# Patient Record
Sex: FEMALE | Race: WHITE | Employment: FULL TIME | URBAN - METROPOLITAN AREA
[De-identification: names, ages, dates, MRNs, and addresses within clinical notes are randomized per-mention and may not be internally consistent; named-entity substitution may affect disease eponyms.]

---

## 2017-01-05 ENCOUNTER — OFFICE VISIT (OUTPATIENT)
Dept: FAMILY MEDICINE CLINIC | Facility: CLINIC | Age: 37
End: 2017-01-05

## 2017-01-05 VITALS
BODY MASS INDEX: 33 KG/M2 | RESPIRATION RATE: 16 BRPM | TEMPERATURE: 98 F | SYSTOLIC BLOOD PRESSURE: 120 MMHG | WEIGHT: 200 LBS | HEART RATE: 80 BPM | DIASTOLIC BLOOD PRESSURE: 76 MMHG

## 2017-01-05 DIAGNOSIS — E53.8 VITAMIN B12 DEFICIENCY: ICD-10-CM

## 2017-01-05 DIAGNOSIS — J45.40 MODERATE PERSISTENT ASTHMA WITHOUT COMPLICATION: ICD-10-CM

## 2017-01-05 DIAGNOSIS — F32.A DEPRESSION, UNSPECIFIED DEPRESSION TYPE: Primary | ICD-10-CM

## 2017-01-05 DIAGNOSIS — J01.90 ACUTE SINUSITIS, RECURRENCE NOT SPECIFIED, UNSPECIFIED LOCATION: ICD-10-CM

## 2017-01-05 DIAGNOSIS — K21.9 GASTROESOPHAGEAL REFLUX DISEASE, ESOPHAGITIS PRESENCE NOT SPECIFIED: ICD-10-CM

## 2017-01-05 PROCEDURE — 99214 OFFICE O/P EST MOD 30 MIN: CPT | Performed by: FAMILY MEDICINE

## 2017-01-05 PROCEDURE — 96372 THER/PROPH/DIAG INJ SC/IM: CPT | Performed by: FAMILY MEDICINE

## 2017-01-05 RX ORDER — LEVOFLOXACIN 500 MG/1
500 TABLET, FILM COATED ORAL DAILY
Qty: 10 TABLET | Refills: 0 | Status: SHIPPED | OUTPATIENT
Start: 2017-01-05 | End: 2017-01-15

## 2017-01-05 RX ORDER — CYANOCOBALAMIN 1000 UG/ML
1000 INJECTION INTRAMUSCULAR; SUBCUTANEOUS ONCE
Status: COMPLETED | OUTPATIENT
Start: 2017-01-05 | End: 2017-01-05

## 2017-01-05 RX ORDER — BENZONATATE 100 MG/1
100 CAPSULE ORAL 3 TIMES DAILY PRN
Qty: 30 CAPSULE | Refills: 0 | Status: SHIPPED | OUTPATIENT
Start: 2017-01-05 | End: 2017-02-04

## 2017-01-05 RX ORDER — MONTELUKAST SODIUM 10 MG/1
10 TABLET ORAL DAILY
Qty: 90 TABLET | Refills: 1 | Status: SHIPPED | OUTPATIENT
Start: 2017-01-05 | End: 2017-05-25

## 2017-01-05 RX ORDER — FLUOXETINE HYDROCHLORIDE 20 MG/1
20 CAPSULE ORAL DAILY
Qty: 90 CAPSULE | Refills: 1 | Status: SHIPPED | OUTPATIENT
Start: 2017-01-05 | End: 2017-02-23

## 2017-01-05 RX ORDER — HYDROCORTISONE VALERATE 2 MG/G
OINTMENT TOPICAL
Qty: 15 G | Refills: 1 | Status: SHIPPED | OUTPATIENT
Start: 2017-01-05 | End: 2017-05-25

## 2017-01-05 RX ORDER — OMEPRAZOLE 20 MG/1
20 CAPSULE, DELAYED RELEASE ORAL
COMMUNITY
End: 2019-10-30

## 2017-01-05 RX ADMIN — CYANOCOBALAMIN 1000 MCG: 1000 INJECTION INTRAMUSCULAR; SUBCUTANEOUS at 11:22:00

## 2017-01-05 NOTE — PROGRESS NOTES
CHIEF COMPLAINT:   Follow up on depression. Also still sick  HPI:   Lisa Rick is a 39year old female who presents for upper respiratory symptoms for  2  weeks. Patient reports congestion. Still with a lot of sinus congestion and cough.   Still Rfl: 4   Albuterol Sulfate  (90 BASE) MCG/ACT Inhalation Aero Soln Inhale 2 puffs into the lungs every 4 (four) hours as needed for Wheezing (With spacer).  Disp: 1 Inhaler Rfl: 0   alprazolam 0.25 MG Oral Tab 1 po once daily prn Disp: 20 tablet Rfl: Vitamin b12 deficiency  Acute sinusitis, recurrence not specified, unspecified location  Gastroesophageal reflux disease, esophagitis presence not specified  Moderate persistent asthma without complication  Depression, unspecified depression type  (prima

## 2017-01-07 PROBLEM — F32.A DEPRESSION: Status: ACTIVE | Noted: 2017-01-07

## 2017-02-02 ENCOUNTER — NURSE ONLY (OUTPATIENT)
Dept: FAMILY MEDICINE CLINIC | Facility: CLINIC | Age: 37
End: 2017-02-02

## 2017-02-02 ENCOUNTER — APPOINTMENT (OUTPATIENT)
Dept: LAB | Age: 37
End: 2017-02-02
Attending: FAMILY MEDICINE
Payer: COMMERCIAL

## 2017-02-02 DIAGNOSIS — E53.8 VITAMIN B12 DEFICIENCY: Primary | ICD-10-CM

## 2017-02-02 DIAGNOSIS — E55.9 VITAMIN D DEFICIENCY: ICD-10-CM

## 2017-02-02 PROCEDURE — 96372 THER/PROPH/DIAG INJ SC/IM: CPT | Performed by: FAMILY MEDICINE

## 2017-02-02 RX ORDER — CYANOCOBALAMIN 1000 UG/ML
1000 INJECTION INTRAMUSCULAR; SUBCUTANEOUS ONCE
Status: COMPLETED | OUTPATIENT
Start: 2017-02-02 | End: 2017-02-02

## 2017-02-02 RX ORDER — ERGOCALCIFEROL 1.25 MG/1
50000 CAPSULE ORAL WEEKLY
Qty: 8 CAPSULE | Refills: 0 | Status: SHIPPED | OUTPATIENT
Start: 2017-02-02 | End: 2017-03-25

## 2017-02-02 RX ADMIN — CYANOCOBALAMIN 1000 MCG: 1000 INJECTION INTRAMUSCULAR; SUBCUTANEOUS at 09:07:00

## 2017-02-23 ENCOUNTER — OFFICE VISIT (OUTPATIENT)
Dept: FAMILY MEDICINE CLINIC | Facility: CLINIC | Age: 37
End: 2017-02-23

## 2017-02-23 VITALS
DIASTOLIC BLOOD PRESSURE: 66 MMHG | TEMPERATURE: 99 F | WEIGHT: 204 LBS | HEIGHT: 65.5 IN | HEART RATE: 80 BPM | BODY MASS INDEX: 33.58 KG/M2 | SYSTOLIC BLOOD PRESSURE: 108 MMHG | RESPIRATION RATE: 12 BRPM

## 2017-02-23 DIAGNOSIS — K21.9 GASTROESOPHAGEAL REFLUX DISEASE, ESOPHAGITIS PRESENCE NOT SPECIFIED: ICD-10-CM

## 2017-02-23 DIAGNOSIS — F32.A DEPRESSION, UNSPECIFIED DEPRESSION TYPE: Primary | ICD-10-CM

## 2017-02-23 DIAGNOSIS — E53.8 VITAMIN B12 DEFICIENCY: ICD-10-CM

## 2017-02-23 DIAGNOSIS — R09.82 POSTNASAL DRIP: ICD-10-CM

## 2017-02-23 PROCEDURE — 99214 OFFICE O/P EST MOD 30 MIN: CPT | Performed by: FAMILY MEDICINE

## 2017-02-23 PROCEDURE — 96372 THER/PROPH/DIAG INJ SC/IM: CPT | Performed by: FAMILY MEDICINE

## 2017-02-23 RX ORDER — CYANOCOBALAMIN 1000 UG/ML
1000 INJECTION INTRAMUSCULAR; SUBCUTANEOUS ONCE
Status: COMPLETED | OUTPATIENT
Start: 2017-02-23 | End: 2017-02-23

## 2017-02-23 RX ORDER — FLUOXETINE HYDROCHLORIDE 40 MG/1
40 CAPSULE ORAL DAILY
Qty: 30 CAPSULE | Refills: 3 | Status: SHIPPED | OUTPATIENT
Start: 2017-02-23 | End: 2017-05-25

## 2017-02-23 RX ORDER — FLUTICASONE PROPIONATE 50 MCG
2 SPRAY, SUSPENSION (ML) NASAL DAILY
Qty: 1 BOTTLE | Refills: 11 | Status: SHIPPED | OUTPATIENT
Start: 2017-02-23 | End: 2018-02-16

## 2017-02-23 RX ADMIN — CYANOCOBALAMIN 1000 MCG: 1000 INJECTION INTRAMUSCULAR; SUBCUTANEOUS at 09:33:00

## 2017-02-23 NOTE — PATIENT INSTRUCTIONS
Stop the 50,000 IU once weekly of vitamin D.  Start 5,000 IU daily of vitamin D. Repeat a vitamin D level in 10 weeks.

## 2017-02-23 NOTE — PROGRESS NOTES
Joni Lew is a 39year old female. CHIEF COMPLAINT   Follow up on depression, GERD, nasal congestion  HPI:   Seeing a counselor every two weeks. Fei Pompa LCSW. Prozac 20mg daily. Helps with anxiety but still with depression.  STill wi Status: Never Used                        Alcohol Use: No                 REVIEW OF SYSTEMS:   GENERAL HEALTH: feels well otherwise  SKIN: denies any unusual skin lesions or rashes  RESPIRATORY: denies shortness of breath with exertion  CARDIOVASCULAR: den

## 2017-03-23 ENCOUNTER — NURSE ONLY (OUTPATIENT)
Dept: FAMILY MEDICINE CLINIC | Facility: CLINIC | Age: 37
End: 2017-03-23

## 2017-03-23 DIAGNOSIS — E53.8 VITAMIN B12 DEFICIENCY: Primary | ICD-10-CM

## 2017-03-23 PROCEDURE — 96372 THER/PROPH/DIAG INJ SC/IM: CPT | Performed by: FAMILY MEDICINE

## 2017-03-23 RX ORDER — CYANOCOBALAMIN 1000 UG/ML
1000 INJECTION INTRAMUSCULAR; SUBCUTANEOUS ONCE
Status: COMPLETED | OUTPATIENT
Start: 2017-03-23 | End: 2017-03-23

## 2017-03-23 RX ADMIN — CYANOCOBALAMIN 1000 MCG: 1000 INJECTION INTRAMUSCULAR; SUBCUTANEOUS at 13:36:00

## 2017-03-27 RX ORDER — ERGOCALCIFEROL 1.25 MG/1
CAPSULE ORAL
Qty: 8 CAPSULE | Refills: 0 | Status: SHIPPED | OUTPATIENT
Start: 2017-03-27 | End: 2017-07-03

## 2017-04-07 ENCOUNTER — OFFICE VISIT (OUTPATIENT)
Dept: FAMILY MEDICINE CLINIC | Facility: CLINIC | Age: 37
End: 2017-04-07

## 2017-04-07 ENCOUNTER — TELEPHONE (OUTPATIENT)
Dept: FAMILY MEDICINE CLINIC | Facility: CLINIC | Age: 37
End: 2017-04-07

## 2017-04-07 VITALS
WEIGHT: 203 LBS | HEART RATE: 76 BPM | DIASTOLIC BLOOD PRESSURE: 70 MMHG | RESPIRATION RATE: 12 BRPM | SYSTOLIC BLOOD PRESSURE: 114 MMHG | BODY MASS INDEX: 33 KG/M2

## 2017-04-07 DIAGNOSIS — G43.909 MIGRAINE WITHOUT STATUS MIGRAINOSUS, NOT INTRACTABLE, UNSPECIFIED MIGRAINE TYPE: ICD-10-CM

## 2017-04-07 DIAGNOSIS — H43.392 FLOATER, VITREOUS, LEFT: ICD-10-CM

## 2017-04-07 DIAGNOSIS — H57.02 PUPIL ASYMMETRY: ICD-10-CM

## 2017-04-07 DIAGNOSIS — H11.32 SUBCONJUNCTIVAL HEMORRHAGE, LEFT: Primary | ICD-10-CM

## 2017-04-07 PROCEDURE — 99213 OFFICE O/P EST LOW 20 MIN: CPT | Performed by: FAMILY MEDICINE

## 2017-04-07 RX ORDER — SUMATRIPTAN 100 MG/1
TABLET, FILM COATED ORAL
Qty: 9 TABLET | Refills: 1 | Status: SHIPPED | OUTPATIENT
Start: 2017-04-07 | End: 2019-03-26

## 2017-04-07 NOTE — PROGRESS NOTES
Frantz Delcid is a 39year old female. CHIEF COMPLAINT   Headache and eye red  HPI:   Sneezing a lot yesterday with allergies. Also had a headache yesterday - still with frontal headache today but better today than yesterday.   History of migraines Smoking Status: Never Smoker                      Smokeless Status: Never Used                        Alcohol Use: No                 REVIEW OF SYSTEMS:   GENERAL HEALTH: feels well otherwise  SKIN: denies any unusual skin lesions or rashes  RESPIRATORY: d

## 2017-04-07 NOTE — TELEPHONE ENCOUNTER
Pt transferred to nurse. Reports having terrible h/a yesterday, had to take a few doses of excedrin migraine for it finally to calm down. No n/v. No blurry vision.  This am still has the h/a, not as bad but has broken blood vessels in eye, wants to make claudia

## 2017-04-20 ENCOUNTER — NURSE ONLY (OUTPATIENT)
Dept: FAMILY MEDICINE CLINIC | Facility: CLINIC | Age: 37
End: 2017-04-20

## 2017-04-20 PROCEDURE — 96372 THER/PROPH/DIAG INJ SC/IM: CPT | Performed by: FAMILY MEDICINE

## 2017-04-20 RX ORDER — CYANOCOBALAMIN 1000 UG/ML
1000 INJECTION INTRAMUSCULAR; SUBCUTANEOUS ONCE
Status: COMPLETED | OUTPATIENT
Start: 2017-04-20 | End: 2017-04-20

## 2017-04-20 RX ADMIN — CYANOCOBALAMIN 1000 MCG: 1000 INJECTION INTRAMUSCULAR; SUBCUTANEOUS at 09:22:00

## 2017-05-02 ENCOUNTER — TELEPHONE (OUTPATIENT)
Dept: FAMILY MEDICINE CLINIC | Facility: CLINIC | Age: 37
End: 2017-05-02

## 2017-05-02 NOTE — TELEPHONE ENCOUNTER
A Medical Records Release of Information Request Form was rec'd via fax from [facility] on 4/25/17. Request was for last 5yrs of pt's records, however, pt was a new patient to our office ([facility]) on 11/14/2016.   Pt's signed [employer] Release dtd 4/

## 2017-05-25 ENCOUNTER — OFFICE VISIT (OUTPATIENT)
Dept: FAMILY MEDICINE CLINIC | Facility: CLINIC | Age: 37
End: 2017-05-25

## 2017-05-25 VITALS
TEMPERATURE: 97 F | WEIGHT: 188 LBS | RESPIRATION RATE: 16 BRPM | BODY MASS INDEX: 30.95 KG/M2 | HEIGHT: 65.5 IN | DIASTOLIC BLOOD PRESSURE: 70 MMHG | HEART RATE: 82 BPM | SYSTOLIC BLOOD PRESSURE: 110 MMHG

## 2017-05-25 DIAGNOSIS — E55.9 VITAMIN D DEFICIENCY: ICD-10-CM

## 2017-05-25 DIAGNOSIS — F32.A DEPRESSION, UNSPECIFIED DEPRESSION TYPE: Primary | ICD-10-CM

## 2017-05-25 DIAGNOSIS — E53.8 VITAMIN B12 DEFICIENCY: ICD-10-CM

## 2017-05-25 PROCEDURE — 96372 THER/PROPH/DIAG INJ SC/IM: CPT | Performed by: FAMILY MEDICINE

## 2017-05-25 PROCEDURE — 99213 OFFICE O/P EST LOW 20 MIN: CPT | Performed by: FAMILY MEDICINE

## 2017-05-25 RX ORDER — FLUOXETINE HYDROCHLORIDE 20 MG/1
CAPSULE ORAL
Qty: 90 CAPSULE | Refills: 1 | Status: SHIPPED | OUTPATIENT
Start: 2017-05-25 | End: 2017-07-26

## 2017-05-25 RX ORDER — ERGOCALCIFEROL 1.25 MG/1
CAPSULE ORAL
Qty: 8 CAPSULE | Refills: 0 | OUTPATIENT
Start: 2017-05-25

## 2017-05-25 RX ORDER — ALBUTEROL SULFATE 90 UG/1
AEROSOL, METERED RESPIRATORY (INHALATION)
Qty: 8.5 G | Refills: 2 | Status: SHIPPED | OUTPATIENT
Start: 2017-05-25 | End: 2019-03-26

## 2017-05-25 RX ORDER — MONTELUKAST SODIUM 10 MG/1
10 TABLET ORAL DAILY
Qty: 90 TABLET | Refills: 1 | Status: SHIPPED | OUTPATIENT
Start: 2017-05-25 | End: 2017-10-30

## 2017-05-25 RX ORDER — ALPRAZOLAM 0.25 MG/1
TABLET ORAL
Qty: 20 TABLET | Refills: 0 | OUTPATIENT
Start: 2017-05-25

## 2017-05-25 RX ORDER — FLUOXETINE HYDROCHLORIDE 40 MG/1
40 CAPSULE ORAL DAILY
Qty: 30 CAPSULE | Refills: 3 | Status: CANCELLED | OUTPATIENT
Start: 2017-05-25

## 2017-05-25 RX ORDER — CYANOCOBALAMIN 1000 UG/ML
1000 INJECTION INTRAMUSCULAR; SUBCUTANEOUS ONCE
Status: COMPLETED | OUTPATIENT
Start: 2017-05-25 | End: 2017-05-25

## 2017-05-25 RX ORDER — HYDROCORTISONE VALERATE 2 MG/G
OINTMENT TOPICAL
Qty: 15 G | Refills: 0 | Status: SHIPPED | OUTPATIENT
Start: 2017-05-25 | End: 2017-07-03

## 2017-05-25 RX ADMIN — CYANOCOBALAMIN 1000 MCG: 1000 INJECTION INTRAMUSCULAR; SUBCUTANEOUS at 08:43:00

## 2017-05-25 NOTE — PROGRESS NOTES
Conner Godfrey is a 39year old female. CHIEF COMPLAINT   Follow up on depression/anxiety  HPI:   Seeing a counselor twice per month. Working out 3 days per week. Still with anxiety and stress. Still feels down at time.   Not suicidal. Overall sym Alcohol Use: No                 REVIEW OF SYSTEMS:   GENERAL HEALTH: feels well otherwise  SKIN: denies any unusual skin lesions or rashes  RESPIRATORY: denies shortness of breath with exertion  CARDIOVASCULAR: denies chest pain on exertion  GI

## 2017-05-25 NOTE — PATIENT INSTRUCTIONS
Vitamin B12 shots every other week for 12 weeks then check vitamin B12. Vitamin D - take 1,000 IU daily and repeat vitamin D in 12 weeks with the B12.

## 2017-06-08 ENCOUNTER — NURSE ONLY (OUTPATIENT)
Dept: FAMILY MEDICINE CLINIC | Facility: CLINIC | Age: 37
End: 2017-06-08

## 2017-06-08 PROCEDURE — 96372 THER/PROPH/DIAG INJ SC/IM: CPT | Performed by: FAMILY MEDICINE

## 2017-06-08 RX ORDER — CYANOCOBALAMIN 1000 UG/ML
1000 INJECTION INTRAMUSCULAR; SUBCUTANEOUS ONCE
Status: COMPLETED | OUTPATIENT
Start: 2017-06-08 | End: 2017-06-08

## 2017-06-08 RX ADMIN — CYANOCOBALAMIN 1000 MCG: 1000 INJECTION INTRAMUSCULAR; SUBCUTANEOUS at 09:06:00

## 2017-06-23 ENCOUNTER — NURSE ONLY (OUTPATIENT)
Dept: FAMILY MEDICINE CLINIC | Facility: CLINIC | Age: 37
End: 2017-06-23

## 2017-06-23 DIAGNOSIS — E53.8 VITAMIN B12 DEFICIENCY: Primary | ICD-10-CM

## 2017-06-23 PROCEDURE — 96372 THER/PROPH/DIAG INJ SC/IM: CPT | Performed by: FAMILY MEDICINE

## 2017-06-23 RX ORDER — CYANOCOBALAMIN 1000 UG/ML
1000 INJECTION INTRAMUSCULAR; SUBCUTANEOUS ONCE
Status: COMPLETED | OUTPATIENT
Start: 2017-06-23 | End: 2017-06-23

## 2017-06-23 RX ADMIN — CYANOCOBALAMIN 1000 MCG: 1000 INJECTION INTRAMUSCULAR; SUBCUTANEOUS at 14:06:00

## 2017-07-03 ENCOUNTER — OFFICE VISIT (OUTPATIENT)
Dept: OBGYN CLINIC | Facility: CLINIC | Age: 37
End: 2017-07-03

## 2017-07-03 VITALS
DIASTOLIC BLOOD PRESSURE: 70 MMHG | WEIGHT: 177 LBS | BODY MASS INDEX: 29.85 KG/M2 | HEART RATE: 91 BPM | HEIGHT: 64.7 IN | SYSTOLIC BLOOD PRESSURE: 108 MMHG

## 2017-07-03 DIAGNOSIS — N76.3 CHRONIC VULVITIS: ICD-10-CM

## 2017-07-03 DIAGNOSIS — Z01.419 ENCOUNTER FOR GYNECOLOGICAL EXAMINATION WITHOUT ABNORMAL FINDING: Primary | ICD-10-CM

## 2017-07-03 DIAGNOSIS — Z30.41 ENCOUNTER FOR BIRTH CONTROL PILLS MAINTENANCE: ICD-10-CM

## 2017-07-03 PROCEDURE — 99385 PREV VISIT NEW AGE 18-39: CPT | Performed by: OBSTETRICS & GYNECOLOGY

## 2017-07-03 PROCEDURE — 99202 OFFICE O/P NEW SF 15 MIN: CPT | Performed by: OBSTETRICS & GYNECOLOGY

## 2017-07-03 RX ORDER — LEVONORGESTREL AND ETHINYL ESTRADIOL 0.1-0.02MG
1 KIT ORAL DAILY
Qty: 90 TABLET | Refills: 4 | Status: SHIPPED | OUTPATIENT
Start: 2017-07-03 | End: 2018-07-27

## 2017-07-03 NOTE — PROGRESS NOTES
Well Woman Exam    HPI:  The patient is a 45yo new patient who had been seeing Dr. Merced Walker. She is here for annual exam today. She denies any abnormal bleeding.  She had one abnormal pap smear in 2010 for which she states she had a colpo and repeat pap smea Other Topics Concern    Caffeine Concern Yes    Comment: 3-5 per day     Stress Concern Yes    Special Diet Yes    Comment: gluten free, diary free    Exercise No    Seat Belt Yes     Social History Narrative   None on file       FAMILY HISTORY:  Famil Systems:  Constitutional:  Denies fevers and chills   Cardiovascular:  denies chest pain or palpitations  Respiratory:  denies shortness of breath  Gastrointestinal:  Has constipation   Genitourinary:  denies dysuria, incontinence, abnormal vaginal dischar reviewed. Increased risk for heart attack and stroke especially with tobacco use was also discussed. Potential side effects and non-contraceptive benefits were reviewed.   She was also counseled on the use of condoms to decrease the risk of pregnancy and

## 2017-07-05 ENCOUNTER — TELEPHONE (OUTPATIENT)
Dept: OBGYN CLINIC | Facility: CLINIC | Age: 37
End: 2017-07-05

## 2017-07-05 LAB
GENITAL VAGINOSIS SCREEN: NEGATIVE
TRICHOMONAS SCREEN: NEGATIVE

## 2017-07-05 NOTE — TELEPHONE ENCOUNTER
----- Message from Mabeline Lesch, MD sent at 7/5/2017 12:15 PM CDT -----  Please let the patient know that her vaginal culture is negative.

## 2017-07-07 ENCOUNTER — NURSE ONLY (OUTPATIENT)
Dept: FAMILY MEDICINE CLINIC | Facility: CLINIC | Age: 37
End: 2017-07-07

## 2017-07-07 DIAGNOSIS — E53.8 VITAMIN B12 DEFICIENCY: Primary | ICD-10-CM

## 2017-07-07 PROCEDURE — 96372 THER/PROPH/DIAG INJ SC/IM: CPT | Performed by: FAMILY MEDICINE

## 2017-07-07 RX ORDER — CYANOCOBALAMIN 1000 UG/ML
1000 INJECTION INTRAMUSCULAR; SUBCUTANEOUS ONCE
Status: COMPLETED | OUTPATIENT
Start: 2017-07-07 | End: 2017-07-07

## 2017-07-07 RX ADMIN — CYANOCOBALAMIN 1000 MCG: 1000 INJECTION INTRAMUSCULAR; SUBCUTANEOUS at 14:08:00

## 2017-07-24 ENCOUNTER — NURSE ONLY (OUTPATIENT)
Dept: FAMILY MEDICINE CLINIC | Facility: CLINIC | Age: 37
End: 2017-07-24

## 2017-07-24 DIAGNOSIS — E53.8 VITAMIN B12 DEFICIENCY: Primary | ICD-10-CM

## 2017-07-24 PROCEDURE — 96372 THER/PROPH/DIAG INJ SC/IM: CPT | Performed by: FAMILY MEDICINE

## 2017-07-24 RX ORDER — CYANOCOBALAMIN 1000 UG/ML
1000 INJECTION INTRAMUSCULAR; SUBCUTANEOUS ONCE
Status: COMPLETED | OUTPATIENT
Start: 2017-07-24 | End: 2017-07-24

## 2017-07-24 RX ADMIN — CYANOCOBALAMIN 1000 MCG: 1000 INJECTION INTRAMUSCULAR; SUBCUTANEOUS at 16:05:00

## 2017-07-26 RX ORDER — FLUOXETINE HYDROCHLORIDE 20 MG/1
CAPSULE ORAL
Qty: 30 CAPSULE | Refills: 0 | Status: SHIPPED | OUTPATIENT
Start: 2017-07-26 | End: 2017-08-03

## 2017-07-31 ENCOUNTER — OFFICE VISIT (OUTPATIENT)
Dept: OBGYN CLINIC | Facility: CLINIC | Age: 37
End: 2017-07-31

## 2017-07-31 VITALS
BODY MASS INDEX: 30 KG/M2 | SYSTOLIC BLOOD PRESSURE: 121 MMHG | DIASTOLIC BLOOD PRESSURE: 78 MMHG | WEIGHT: 177.19 LBS | HEART RATE: 79 BPM

## 2017-07-31 DIAGNOSIS — N90.89 VULVAR IRRITATION: Primary | ICD-10-CM

## 2017-07-31 PROCEDURE — 99213 OFFICE O/P EST LOW 20 MIN: CPT | Performed by: OBSTETRICS & GYNECOLOGY

## 2017-07-31 NOTE — PROGRESS NOTES
Vaishali Gamez is a 39year old female [id number] Patient's last menstrual period was 06/14/2017. Patient presents with:   Follow - Up: f/u on vaginal rash  Patient presents today for follow up after being seen on 7/3/17 for annual exam with chronic vulv Outpatient Prescriptions:   •  triamcinolone acetonide 0.1 % External Ointment, Apply 1 Application topically 2 (two) times daily. , Disp: 15 g, Rfl: 6  •  FLUoxetine HCl (PROZAC) 20 MG Oral Cap, 3 po daily, Disp: 30 capsule, Rfl: 0  •  Levonorgestrel-Ethin follow - up. Diagnoses and all orders for this visit:    Vulvar irritation    Other orders  -     triamcinolone acetonide 0.1 % External Ointment; Apply 1 Application topically 2 (two) times daily.           Signed Prescriptions Disp Refills    lynetteo

## 2017-08-03 ENCOUNTER — OFFICE VISIT (OUTPATIENT)
Dept: FAMILY MEDICINE CLINIC | Facility: CLINIC | Age: 37
End: 2017-08-03

## 2017-08-03 VITALS
BODY MASS INDEX: 30.56 KG/M2 | RESPIRATION RATE: 16 BRPM | HEIGHT: 64 IN | TEMPERATURE: 99 F | SYSTOLIC BLOOD PRESSURE: 100 MMHG | WEIGHT: 179 LBS | DIASTOLIC BLOOD PRESSURE: 64 MMHG | HEART RATE: 64 BPM

## 2017-08-03 DIAGNOSIS — F32.A DEPRESSION, UNSPECIFIED DEPRESSION TYPE: Primary | ICD-10-CM

## 2017-08-03 PROCEDURE — 99213 OFFICE O/P EST LOW 20 MIN: CPT | Performed by: FAMILY MEDICINE

## 2017-08-03 RX ORDER — MUPIROCIN CALCIUM 20 MG/G
CREAM TOPICAL
Qty: 15 G | Refills: 1 | Status: SHIPPED | OUTPATIENT
Start: 2017-08-03 | End: 2019-03-26

## 2017-08-03 RX ORDER — ALPRAZOLAM 0.25 MG/1
TABLET ORAL
Qty: 30 TABLET | Refills: 0 | Status: SHIPPED | OUTPATIENT
Start: 2017-08-03 | End: 2017-10-02

## 2017-08-03 RX ORDER — BUPROPION HYDROCHLORIDE 150 MG/1
150 TABLET ORAL DAILY
Qty: 30 TABLET | Refills: 1 | Status: SHIPPED | OUTPATIENT
Start: 2017-08-03 | End: 2017-10-30 | Stop reason: ALTCHOICE

## 2017-08-03 NOTE — PROGRESS NOTES
Lisa Rick is a 39year old female. CHIEF COMPLAINT   Follow up on depression  HPI:   Not doing well before and during menses. Fatigue during those weeks, depression worse during those weeks. Seeing a counselor once to twice per month.   On carolyn HEALTH: feels well otherwise  SKIN: as above  RESPIRATORY: denies shortness of breath with exertion  CARDIOVASCULAR: denies chest pain on exertion  GI: denies abdominal pain and denies heartburn  NEURO: denies headaches    EXAM:   /64   Pulse 64   Te

## 2017-08-03 NOTE — PATIENT INSTRUCTIONS
Decrease prozac to 40mg daily x 1 week then decrease to 20mg x 1 week then stop. Once you get to 20mg, start the wellbutrin.

## 2017-10-02 ENCOUNTER — PATIENT MESSAGE (OUTPATIENT)
Dept: FAMILY MEDICINE CLINIC | Facility: CLINIC | Age: 37
End: 2017-10-02

## 2017-10-02 NOTE — TELEPHONE ENCOUNTER
From: Kamryn Smith  To: Irma Herring  Sent: 10/2/2017 2:56 PM CDT  Subject: Prescription Question    Alfonzo Parker Postal-    I hope you had a good weekend.  I've been in touch trying to find a psychiatrist that is in network with my health ins

## 2017-10-03 RX ORDER — ALPRAZOLAM 0.25 MG/1
TABLET ORAL
Qty: 30 TABLET | Refills: 0
Start: 2017-10-03 | End: 2017-11-27

## 2017-10-03 RX ORDER — FLUOXETINE HYDROCHLORIDE 20 MG/1
20 CAPSULE ORAL DAILY
Qty: 30 CAPSULE | Refills: 1 | Status: SHIPPED | OUTPATIENT
Start: 2017-10-03 | End: 2018-11-14

## 2017-10-03 NOTE — TELEPHONE ENCOUNTER
From: Ashlee Pride  Sent: 10/2/2017 2:51 PM CDT  Subject: Medication Renewal Request    Ashlee Pride would like a refill of the following medications:     ALPRAZolam 0.25 MG Oral Tab Caio Martinez PA-C]    Preferred pharmacy: [facility]

## 2017-10-30 ENCOUNTER — OFFICE VISIT (OUTPATIENT)
Dept: FAMILY MEDICINE CLINIC | Facility: CLINIC | Age: 37
End: 2017-10-30

## 2017-10-30 VITALS
HEART RATE: 68 BPM | HEIGHT: 64 IN | TEMPERATURE: 98 F | RESPIRATION RATE: 12 BRPM | DIASTOLIC BLOOD PRESSURE: 80 MMHG | SYSTOLIC BLOOD PRESSURE: 120 MMHG | BODY MASS INDEX: 31.07 KG/M2 | WEIGHT: 182 LBS

## 2017-10-30 DIAGNOSIS — F32.A DEPRESSION, UNSPECIFIED DEPRESSION TYPE: ICD-10-CM

## 2017-10-30 DIAGNOSIS — E55.9 VITAMIN D DEFICIENCY: Primary | ICD-10-CM

## 2017-10-30 DIAGNOSIS — E53.8 VITAMIN B12 DEFICIENCY: ICD-10-CM

## 2017-10-30 DIAGNOSIS — J45.40 MODERATE PERSISTENT ASTHMA WITHOUT COMPLICATION: ICD-10-CM

## 2017-10-30 PROCEDURE — 99214 OFFICE O/P EST MOD 30 MIN: CPT | Performed by: FAMILY MEDICINE

## 2017-10-30 RX ORDER — MONTELUKAST SODIUM 10 MG/1
10 TABLET ORAL DAILY
Qty: 90 TABLET | Refills: 1 | Status: SHIPPED | OUTPATIENT
Start: 2017-10-30 | End: 2018-05-06

## 2017-10-30 RX ORDER — MULTIVIT-MIN/IRON/FOLIC ACID/K 18-600-40
CAPSULE ORAL
COMMUNITY
End: 2019-10-30

## 2017-10-30 NOTE — PROGRESS NOTES
Juan Alberto English is a 44year old female. CHIEF COMPLAINT   Follow up on depression, vitamin B12/vitamin d deficiency, asthma  HPI:   Saw psychiatry 2 weeks ago. Dr. Lisa Aguilar - psychiatry. Psychiatry believes she may has bipolar tendencies.    Started o • Extrinsic asthma, unspecified    • GI DISORDER     reflux   • Herpes simplex       Social History:  Smoking status: Never Smoker                                                              Smokeless tobacco: Never Used                      Alcohol use

## 2017-11-28 RX ORDER — ALPRAZOLAM 0.25 MG/1
TABLET ORAL
Qty: 30 TABLET | Refills: 0 | Status: SHIPPED | OUTPATIENT
Start: 2017-11-28

## 2017-11-28 NOTE — TELEPHONE ENCOUNTER
Not protocol medication. LOV :10/30/17 med check   Last labs done :11/14/16  Next appointment :not yet made. Please see pending medication. Refill if appropriate.    Last refill:    Date:10/03/17  Amount :30 tablets no refill   Medication: alprazolam

## 2017-12-15 ENCOUNTER — HOSPITAL ENCOUNTER (OUTPATIENT)
Age: 37
Discharge: HOME | End: 2017-12-15
Attending: EMERGENCY MEDICINE
Payer: COMMERCIAL

## 2017-12-15 VITALS
SYSTOLIC BLOOD PRESSURE: 125 MMHG | HEIGHT: 64 IN | OXYGEN SATURATION: 99 % | BODY MASS INDEX: 30.73 KG/M2 | WEIGHT: 180 LBS | DIASTOLIC BLOOD PRESSURE: 80 MMHG | HEART RATE: 86 BPM | RESPIRATION RATE: 19 BRPM | TEMPERATURE: 98 F

## 2017-12-15 DIAGNOSIS — B02.9 HERPES ZOSTER WITHOUT COMPLICATION: Primary | ICD-10-CM

## 2017-12-15 PROCEDURE — 99203 OFFICE O/P NEW LOW 30 MIN: CPT

## 2017-12-15 PROCEDURE — 99213 OFFICE O/P EST LOW 20 MIN: CPT

## 2017-12-15 RX ORDER — FAMCICLOVIR 500 MG/1
500 TABLET, FILM COATED ORAL 3 TIMES DAILY
Qty: 21 TABLET | Refills: 0 | Status: SHIPPED | OUTPATIENT
Start: 2017-12-15 | End: 2017-12-21

## 2017-12-15 RX ORDER — LAMOTRIGINE 100 MG/1
TABLET ORAL
Refills: 1 | COMMUNITY
Start: 2017-11-08 | End: 2018-11-14

## 2017-12-15 RX ORDER — IBUPROFEN 600 MG/1
600 TABLET ORAL ONCE
Status: COMPLETED | OUTPATIENT
Start: 2017-12-15 | End: 2017-12-15

## 2017-12-15 RX ORDER — HYDROCODONE BITARTRATE AND ACETAMINOPHEN 5; 325 MG/1; MG/1
1-2 TABLET ORAL EVERY 4 HOURS PRN
Qty: 20 TABLET | Refills: 0 | Status: SHIPPED | OUTPATIENT
Start: 2017-12-15 | End: 2017-12-22

## 2017-12-15 NOTE — ED PROVIDER NOTES
Patient Seen in: [address]    History   Patient presents with:  Rash    Stated Complaint: Back pain and rash     HPI    The patient is a 42-year-old female who presents with 2 days of severe left upper back pain just un Neurological: She has normal strength. No sensory deficit. Skin: Rash noted. Rash is vesicular. Nursing note and vitals reviewed.             ED Course   Labs Reviewed - No data to display    ED Course as of Dec 15 1645  --------------------------------

## 2017-12-15 NOTE — ED INITIAL ASSESSMENT (HPI)
Back pain for several days  Pain is unable to be managed at home with OTC  +itching   Pt.  Noticed a rash today  +vesicular rash noted to left thoracic area that radiates to lateral chest  -drainage

## 2017-12-20 ENCOUNTER — TELEPHONE (OUTPATIENT)
Dept: FAMILY MEDICINE CLINIC | Facility: CLINIC | Age: 37
End: 2017-12-20

## 2017-12-20 NOTE — TELEPHONE ENCOUNTER
See Urgent care note. Patient e-mail was accurate. She was told to call triage. She has shingles exactly like the progress note says. On Famvir 500 mg TID and Norco 5/325 PRN.  Has 5 left of Famvir and 4 Norco left and with long weekend coming she's nervous

## 2017-12-21 ENCOUNTER — OFFICE VISIT (OUTPATIENT)
Dept: FAMILY MEDICINE CLINIC | Facility: CLINIC | Age: 37
End: 2017-12-21

## 2017-12-21 VITALS
RESPIRATION RATE: 16 BRPM | SYSTOLIC BLOOD PRESSURE: 112 MMHG | WEIGHT: 182 LBS | HEIGHT: 64 IN | TEMPERATURE: 99 F | OXYGEN SATURATION: 98 % | HEART RATE: 81 BPM | BODY MASS INDEX: 31.07 KG/M2 | DIASTOLIC BLOOD PRESSURE: 76 MMHG

## 2017-12-21 DIAGNOSIS — B02.9 HERPES ZOSTER WITHOUT COMPLICATION: Primary | ICD-10-CM

## 2017-12-21 DIAGNOSIS — R07.89 CHEST DISCOMFORT: ICD-10-CM

## 2017-12-21 PROCEDURE — 93000 ELECTROCARDIOGRAM COMPLETE: CPT | Performed by: NURSE PRACTITIONER

## 2017-12-21 PROCEDURE — 99214 OFFICE O/P EST MOD 30 MIN: CPT | Performed by: NURSE PRACTITIONER

## 2017-12-21 RX ORDER — FAMCICLOVIR 500 MG/1
500 TABLET, FILM COATED ORAL 3 TIMES DAILY
Qty: 9 TABLET | Refills: 0 | Status: SHIPPED | OUTPATIENT
Start: 2017-12-21 | End: 2017-12-24

## 2017-12-21 RX ORDER — HYDROCODONE BITARTRATE AND ACETAMINOPHEN 5; 325 MG/1; MG/1
1-2 TABLET ORAL EVERY 4 HOURS PRN
Qty: 20 TABLET | Refills: 0 | Status: SHIPPED | OUTPATIENT
Start: 2017-12-21 | End: 2018-01-12 | Stop reason: ALTCHOICE

## 2017-12-21 RX ORDER — PREGABALIN 50 MG/1
50 CAPSULE ORAL 2 TIMES DAILY
Qty: 60 CAPSULE | Refills: 1 | Status: SHIPPED | OUTPATIENT
Start: 2017-12-21 | End: 2018-01-12 | Stop reason: DRUGHIGH

## 2017-12-21 NOTE — PROGRESS NOTES
Vaishali Gamez is a 44year old female. HPI:   Patient presents today for a follow up from immediate care. She was seen 12/15/17 at immediate care and was diagnosed with shingles.  She was started on Famciclovir 500 mg TID and given an rx for Norco. Cap Take 1 capsule (20 mg total) by mouth daily. Disp: 30 capsule Rfl: 1   Mupirocin Calcium 2 % External Cream Apply TID x 7 days. Disp: 15 g Rfl: 1   triamcinolone acetonide 0.1 % External Ointment Apply 1 Application topically 2 (two) times daily.  Disp: of erythematous vesicles extending across pt's anterior left rib cage and a few similar lesions near the left breast. There is some crusting noted to the cluster of vesicles to the left mid back. No drainage noted.   NECK: supple,no adenopathy  LUNGS: clear 1  - Famciclovir 500 MG Oral Tab; Take 1 tablet (500 mg total) by mouth 3 (three) times daily. Dispense: 9 tablet; Refill: 0  - COMP METABOLIC PANEL (14)    2.  Chest discomfort  EKG completed- NSR, no acute changes.   - ELECTROCARDIOGRAM, COMPLETE  - COMP

## 2018-01-03 ENCOUNTER — OFFICE VISIT (OUTPATIENT)
Dept: FAMILY MEDICINE CLINIC | Facility: CLINIC | Age: 38
End: 2018-01-03

## 2018-01-03 VITALS
SYSTOLIC BLOOD PRESSURE: 118 MMHG | RESPIRATION RATE: 16 BRPM | WEIGHT: 188 LBS | HEIGHT: 64 IN | DIASTOLIC BLOOD PRESSURE: 68 MMHG | TEMPERATURE: 99 F | OXYGEN SATURATION: 97 % | HEART RATE: 101 BPM | BODY MASS INDEX: 32.1 KG/M2

## 2018-01-03 DIAGNOSIS — J01.00 ACUTE MAXILLARY SINUSITIS, RECURRENCE NOT SPECIFIED: Primary | ICD-10-CM

## 2018-01-03 DIAGNOSIS — J40 BRONCHITIS: ICD-10-CM

## 2018-01-03 PROCEDURE — 99213 OFFICE O/P EST LOW 20 MIN: CPT | Performed by: NURSE PRACTITIONER

## 2018-01-03 RX ORDER — PREDNISONE 20 MG/1
40 TABLET ORAL DAILY
Qty: 10 TABLET | Refills: 0 | Status: SHIPPED | OUTPATIENT
Start: 2018-01-03 | End: 2018-01-08

## 2018-01-03 RX ORDER — CODEINE PHOSPHATE AND GUAIFENESIN 10; 100 MG/5ML; MG/5ML
5 SOLUTION ORAL NIGHTLY PRN
Qty: 120 ML | Refills: 0 | Status: SHIPPED | OUTPATIENT
Start: 2018-01-03 | End: 2018-02-16 | Stop reason: ALTCHOICE

## 2018-01-03 RX ORDER — DOXYCYCLINE HYCLATE 100 MG/1
100 CAPSULE ORAL 2 TIMES DAILY
Qty: 14 CAPSULE | Refills: 0 | Status: SHIPPED | OUTPATIENT
Start: 2018-01-03 | End: 2018-01-10

## 2018-01-03 RX ORDER — BENZONATATE 200 MG/1
200 CAPSULE ORAL 3 TIMES DAILY PRN
Qty: 20 CAPSULE | Refills: 0 | Status: SHIPPED | OUTPATIENT
Start: 2018-01-03 | End: 2018-01-29 | Stop reason: ALTCHOICE

## 2018-01-03 NOTE — PROGRESS NOTES
Patient presents with:  Sinus Problem      HPI:   Conner Godfrey is a 44year old female who presents for sinus congestion and cough for  1 1/2  weeks. Cough started gradually, tight, deep, dry, worse at night.   Having chest tightness and using a lamoTRIgine 100 MG Oral Tab TK 1 TO 2 TS PO D Disp:  Rfl: 1   Cholecalciferol (VITAMIN D) 2000 units Oral Cap Take by mouth. Disp:  Rfl:    FLUoxetine HCl (PROZAC) 20 MG Oral Cap Take 1 capsule (20 mg total) by mouth daily.  Disp: 30 capsule Rfl: 1   Levono EXAM:   /68   Pulse 101   Temp 98.8 °F (37.1 °C) (Oral)   Resp 16   Ht 64\"   Wt 188 lb   LMP 01/03/2018 (Exact Date)   SpO2 97%   Breastfeeding?  No   BMI 32.27 kg/m²   GENERAL: well developed, well nourished,in no apparent distress  SKIN: no tena Sig: Take 5 mL by mouth nightly as needed for cough. Take nightly for cough. Do not drive or handle heavy machinery when taking this medication.       benzonatate 200 MG Oral Cap 20 capsule 0      Sig: Take 1 capsule (200 mg total) by mouth 3 (three) shefali · If you develop a rash, hives, itching, throat tightness, or shortness of breath while on the antibiotic or shortly after completion, please call your PCP immediately and stop your antibiotic. This may be an allergic reaction.   If your physician's office A physical exam, health history, and certain tests help your healthcare provider make the diagnosis. Health history  Your healthcare provider will ask you about your symptoms. The exam  Your provider listens Stevie Runner chest for signs of congestion.  He or s Most cases of bronchitis are caused by cold or flu viruses. They don’t need antibiotics to treat them, even if your mucus is thick and green or yellow.  Antibiotics don’t treat viral illness and antibiotics have not been shown to have any benefit in cases o © 8200-8707 [employer] [address] [facility] [place] [place]. All rights reserved. This information is not intended as a substitute for professional medical care. Always follow your healthcare professional's instructions.         Sinusit · Over-the-counter decongestants may be used unless a similar medicine was prescribed. Nasal sprays work the fastest. Use one that contains phenylephrine or oxymetazoline. First blow the nose gently. Then use the spray.  Do not use these medicines more ofte © 8967-2942 [address] [facility] [place] [place]. All rights reserved. This information is not intended as a substitute for professional medical care. Always follow your healthcare professional's instructions.               Victor Hugo Avendano

## 2018-01-03 NOTE — PATIENT INSTRUCTIONS
-   Increase oral fluids to loosen and thin secretions, eat a nutritious diet  -   Ibuprofen for pain as packet insert; age appropriate with weight  -   Benzonatate for daytime cough  -   Cheratussin for night time cough   -   Return to clinic if symptoms Acute bronchitis is when the airways in your lungs (bronchial tubes) become red and swollen (inflamed). It is usually caused by a viral infection. But it can also occur because of a bacteria or allergen.  Symptoms include a cough that produces yellow or gre · A chest X-ray. This is done if your healthcare provider thinks you have pneumonia. · Tests to check for an underlying condition. Other tests may be done to check for things such as allergies, asthma, or COPD (chronic obstructive pulmonary disease).  You · Take the medicines as directed. For instance, some medicines should be taken with food. · Ask about side effects. Ask your provider or pharmacist what side effects are common, and what to do about them.   Follow-up care  You should see your provider clyde · Take the full course of antibiotics as instructed. Do not stop taking them, even if you feel better. · Drink plenty of water, hot tea, and other liquids. This may help thin mucus. It also may promote sinus drainage.   · Heat may help soothe painful areas Call your healthcare provider if any of these occur:  · Facial pain or headache becoming more severe  · Stiff neck  · Unusual drowsiness or confusion  · Swelling of the forehead or eyelids  · Vision problems, including blurred or double vision  · Fever of

## 2018-01-12 ENCOUNTER — OFFICE VISIT (OUTPATIENT)
Dept: FAMILY MEDICINE CLINIC | Facility: CLINIC | Age: 38
End: 2018-01-12

## 2018-01-12 VITALS
OXYGEN SATURATION: 98 % | WEIGHT: 192 LBS | SYSTOLIC BLOOD PRESSURE: 110 MMHG | HEART RATE: 84 BPM | TEMPERATURE: 99 F | DIASTOLIC BLOOD PRESSURE: 70 MMHG | BODY MASS INDEX: 32.78 KG/M2 | HEIGHT: 64 IN | RESPIRATION RATE: 18 BRPM

## 2018-01-12 DIAGNOSIS — R05.9 COUGH: ICD-10-CM

## 2018-01-12 DIAGNOSIS — B02.29 POST HERPETIC NEURALGIA: Primary | ICD-10-CM

## 2018-01-12 PROCEDURE — 99214 OFFICE O/P EST MOD 30 MIN: CPT | Performed by: NURSE PRACTITIONER

## 2018-01-12 RX ORDER — PREGABALIN 75 MG/1
75 CAPSULE ORAL 2 TIMES DAILY
Qty: 60 CAPSULE | Refills: 1 | Status: SHIPPED | OUTPATIENT
Start: 2018-01-12 | End: 2018-02-16

## 2018-01-12 NOTE — PROGRESS NOTES
William Jeronimo is a 44year old female. HPI:   Patient presents today for a follow up on shingles. Patient is also reporting she was recently treated for bronchitis and completed the antibiotic 3 days ago.  She was also prescribed steroids and she co Disp: 90 tablet Rfl: 1   FLUoxetine HCl (PROZAC) 20 MG Oral Cap Take 1 capsule (20 mg total) by mouth daily. Disp: 30 capsule Rfl: 1   Mupirocin Calcium 2 % External Cream Apply TID x 7 days.  Disp: 15 g Rfl: 1   triamcinolone acetonide 0.1 % External Ointm Date)   SpO2 98%   BMI 32.96 kg/m²   GENERAL: well developed, well nourished,in no apparent distress  SKIN: no further vesicles. Mild erythema noted where some lesions were previously-left anterior rib cage. Lesions to posterior back (left side) healed.   H patient indicates understanding of these issues and agrees to the plan. The patient is asked to return in 1 month. Pt to clall the office if cough worsens, fails to improve or if she develops any further symptoms.  Pt stated understanding and is agreeable t

## 2018-01-17 ENCOUNTER — PATIENT MESSAGE (OUTPATIENT)
Dept: FAMILY MEDICINE CLINIC | Facility: CLINIC | Age: 38
End: 2018-01-17

## 2018-01-17 NOTE — TELEPHONE ENCOUNTER
From: Antione Smith  To: AURELIA Sharif  Sent: 1/17/2018 12:03 PM CST  Subject: Visit Follow-up Question    Good afternoon, Conchita Thomas-    Thank you for providing the sample inhaler when I saw you on Friday. It seems to be helping during the day.

## 2018-01-19 ENCOUNTER — OFFICE VISIT (OUTPATIENT)
Dept: FAMILY MEDICINE CLINIC | Facility: CLINIC | Age: 38
End: 2018-01-19

## 2018-01-19 ENCOUNTER — HOSPITAL ENCOUNTER (OUTPATIENT)
Dept: GENERAL RADIOLOGY | Age: 38
Discharge: HOME | End: 2018-01-19
Attending: NURSE PRACTITIONER
Payer: COMMERCIAL

## 2018-01-19 VITALS
SYSTOLIC BLOOD PRESSURE: 104 MMHG | RESPIRATION RATE: 18 BRPM | TEMPERATURE: 98 F | HEART RATE: 92 BPM | DIASTOLIC BLOOD PRESSURE: 72 MMHG | BODY MASS INDEX: 32.78 KG/M2 | OXYGEN SATURATION: 99 % | WEIGHT: 192 LBS | HEIGHT: 64 IN

## 2018-01-19 DIAGNOSIS — R06.02 SHORTNESS OF BREATH: ICD-10-CM

## 2018-01-19 DIAGNOSIS — J20.9 ACUTE BRONCHITIS, UNSPECIFIED ORGANISM: Primary | ICD-10-CM

## 2018-01-19 DIAGNOSIS — R05.9 COUGH: ICD-10-CM

## 2018-01-19 PROCEDURE — 71046 X-RAY EXAM CHEST 2 VIEWS: CPT | Performed by: NURSE PRACTITIONER

## 2018-01-19 PROCEDURE — 99214 OFFICE O/P EST MOD 30 MIN: CPT | Performed by: NURSE PRACTITIONER

## 2018-01-19 PROCEDURE — 94640 AIRWAY INHALATION TREATMENT: CPT | Performed by: NURSE PRACTITIONER

## 2018-01-19 RX ORDER — METHYLPREDNISOLONE 4 MG/1
TABLET ORAL
Qty: 1 KIT | Refills: 0 | Status: SHIPPED | OUTPATIENT
Start: 2018-01-19 | End: 2018-01-29 | Stop reason: ALTCHOICE

## 2018-01-19 RX ORDER — LEVOFLOXACIN 500 MG/1
500 TABLET, FILM COATED ORAL DAILY
Qty: 10 TABLET | Refills: 0 | Status: SHIPPED | OUTPATIENT
Start: 2018-01-19 | End: 2018-01-29 | Stop reason: ALTCHOICE

## 2018-01-19 RX ORDER — ALBUTEROL SULFATE 2.5 MG/3ML
2.5 SOLUTION RESPIRATORY (INHALATION) ONCE
Status: COMPLETED | OUTPATIENT
Start: 2018-01-19 | End: 2018-01-19

## 2018-01-19 RX ADMIN — ALBUTEROL SULFATE 2.5 MG: 2.5 SOLUTION RESPIRATORY (INHALATION) at 12:01:00

## 2018-01-20 NOTE — PROGRESS NOTES
Joanne Soto is a 44year old female. HPI:   Patient presents today for a follow up on a cough. Patient was seen in [facility] 1/3/18 and diagnosed with a sinus infection as well as bronchitis. She was started on doxycycline, prednisone and tessalon.  She (VITAMIN D) 2000 units Oral Cap Take by mouth. Disp:  Rfl:    Montelukast Sodium (SINGULAIR) 10 MG Oral Tab Take 1 tablet (10 mg total) by mouth daily.  Disp: 90 tablet Rfl: 1   FLUoxetine HCl (PROZAC) 20 MG Oral Cap Take 1 capsule (20 mg total) by mouth da 01/03/2018 (Exact Date)   SpO2 99%   BMI 32.96 kg/m²   GENERAL: well developed, well nourished,in no apparent distress  HEENT: TM clear bilaterally, nose no congestion, throat clear no erythema without mass.    EYES: PERRLA, EOM intact, sclera clear without activity or exercising while on antibiotic and steroids. Pt voiced understanding. Probiotic for duration of antibiotic. Continue Pulmicort 2 puffs BID-rinse mouth out after use. Drink plenty of fluids-stay hydrated.   - XR CHEST PA + LAT CHEST (CPT=71046

## 2018-01-29 ENCOUNTER — OFFICE VISIT (OUTPATIENT)
Dept: FAMILY MEDICINE CLINIC | Facility: CLINIC | Age: 38
End: 2018-01-29

## 2018-01-29 VITALS
DIASTOLIC BLOOD PRESSURE: 70 MMHG | RESPIRATION RATE: 16 BRPM | HEIGHT: 64 IN | WEIGHT: 187 LBS | BODY MASS INDEX: 31.92 KG/M2 | TEMPERATURE: 99 F | HEART RATE: 88 BPM | OXYGEN SATURATION: 99 % | SYSTOLIC BLOOD PRESSURE: 104 MMHG

## 2018-01-29 DIAGNOSIS — F32.A DEPRESSION, UNSPECIFIED DEPRESSION TYPE: ICD-10-CM

## 2018-01-29 DIAGNOSIS — Z09 FOLLOW-UP FOR RESOLVED CONDITION: ICD-10-CM

## 2018-01-29 DIAGNOSIS — J40 BRONCHITIS: Primary | ICD-10-CM

## 2018-01-29 PROCEDURE — 99213 OFFICE O/P EST LOW 20 MIN: CPT | Performed by: NURSE PRACTITIONER

## 2018-01-29 RX ORDER — BENZONATATE 100 MG/1
100 CAPSULE ORAL 3 TIMES DAILY PRN
Qty: 20 CAPSULE | Refills: 0 | Status: SHIPPED | OUTPATIENT
Start: 2018-01-29 | End: 2018-02-16

## 2018-01-29 RX ORDER — BUDESONIDE AND FORMOTEROL FUMARATE DIHYDRATE 160; 4.5 UG/1; UG/1
2 AEROSOL RESPIRATORY (INHALATION) 2 TIMES DAILY
Qty: 1 INHALER | Refills: 0 | COMMUNITY
Start: 2018-01-29 | End: 2018-02-16

## 2018-01-29 NOTE — PROGRESS NOTES
Vaishali Gamez is a 44year old female. HPI:   Patient presents today for a 10 day follow up on bronchitis.  She reports that her cough is improving, she reports that she does note it to be worse in the morning and occasionally she will experience th total) by mouth 3 (three) times daily as needed for cough. Take with a full glass of water and DO NOT CHEW.  Disp: 20 capsule Rfl: 0   lamoTRIgine 100 MG Oral Tab TK 1 TO 2 TS PO D Disp:  Rfl: 1   ALPRAZOLAM 0.25 MG Oral Tab TAKE 1 TABLET BY MOUTH EVERY DAY Denies ear pain. RESPIRATORY: denies shortness of breath with exertion. Reports cough.   CARDIOVASCULAR: denies chest pain on exertion  NEURO: denies headaches  PSYCHE: reports feeling overwhelmed and stressed lately-denies any thoughts of harming herself times daily. Dispense: 1 Inhaler; Refill: 0  - benzonatate 100 MG Oral Cap; Take 1 capsule (100 mg total) by mouth 3 (three) times daily as needed for cough. Dispense: 20 capsule; Refill: 0    2. Follow-up for resolved condition    3.  Depression, unspeci

## 2018-02-16 ENCOUNTER — OFFICE VISIT (OUTPATIENT)
Dept: FAMILY MEDICINE CLINIC | Facility: CLINIC | Age: 38
End: 2018-02-16

## 2018-02-16 VITALS
WEIGHT: 187 LBS | TEMPERATURE: 99 F | BODY MASS INDEX: 31.92 KG/M2 | HEIGHT: 64 IN | RESPIRATION RATE: 18 BRPM | SYSTOLIC BLOOD PRESSURE: 110 MMHG | DIASTOLIC BLOOD PRESSURE: 74 MMHG | HEART RATE: 78 BPM

## 2018-02-16 DIAGNOSIS — Z09 FOLLOW UP: Primary | ICD-10-CM

## 2018-02-16 DIAGNOSIS — B02.9 HERPES ZOSTER WITHOUT COMPLICATION: ICD-10-CM

## 2018-02-16 DIAGNOSIS — J40 BRONCHITIS: ICD-10-CM

## 2018-02-16 PROCEDURE — 99213 OFFICE O/P EST LOW 20 MIN: CPT | Performed by: NURSE PRACTITIONER

## 2018-02-16 RX ORDER — FLUTICASONE PROPIONATE 50 MCG
2 SPRAY, SUSPENSION (ML) NASAL DAILY
Qty: 1 BOTTLE | Refills: 2 | Status: SHIPPED | OUTPATIENT
Start: 2018-02-16 | End: 2019-02-11

## 2018-02-16 RX ORDER — DEXTROAMPHETAMINE SACCHARATE, AMPHETAMINE ASPARTATE, DEXTROAMPHETAMINE SULFATE AND AMPHETAMINE SULFATE 2.5; 2.5; 2.5; 2.5 MG/1; MG/1; MG/1; MG/1
TABLET ORAL
COMMUNITY
Start: 2018-02-14 | End: 2018-11-14

## 2018-02-16 NOTE — PROGRESS NOTES
Jessi White is a 44year old female. HPI:   Patient presents today for a follow up on bronchitis and herpes zoster. She reports that she is feeling much better-has an occasional cough but nothing like it was. Denies any fever or chills.  No longer 20 MG Oral Capsule Delayed Release Take 20 mg by mouth every morning before breakfast. Disp:  Rfl:       Past Medical History:   Diagnosis Date   • DEPRESSION    • Extrinsic asthma, unspecified    • GI DISORDER     reflux   • Herpes simplex       Social Hi longer taking steroid inhaler or rescue inhaler. 3. Herpes zoster without complication  Resolved. No further lesions or rashes. Self discontinued Lyrica 1 week ago. The patient indicates understanding of these issues and agrees to the plan.  The p

## 2018-03-19 ENCOUNTER — HOSPITAL ENCOUNTER (OUTPATIENT)
Dept: GENERAL RADIOLOGY | Age: 38
Discharge: HOME | End: 2018-03-19
Attending: NURSE PRACTITIONER
Payer: COMMERCIAL

## 2018-03-19 ENCOUNTER — OFFICE VISIT (OUTPATIENT)
Dept: FAMILY MEDICINE CLINIC | Facility: CLINIC | Age: 38
End: 2018-03-19

## 2018-03-19 VITALS
BODY MASS INDEX: 32.44 KG/M2 | DIASTOLIC BLOOD PRESSURE: 72 MMHG | WEIGHT: 190 LBS | SYSTOLIC BLOOD PRESSURE: 120 MMHG | RESPIRATION RATE: 18 BRPM | TEMPERATURE: 98 F | OXYGEN SATURATION: 98 % | HEIGHT: 64 IN | HEART RATE: 89 BPM

## 2018-03-19 DIAGNOSIS — M25.571 ACUTE RIGHT ANKLE PAIN: ICD-10-CM

## 2018-03-19 DIAGNOSIS — J01.00 ACUTE NON-RECURRENT MAXILLARY SINUSITIS: Primary | ICD-10-CM

## 2018-03-19 DIAGNOSIS — R05.9 COUGH: ICD-10-CM

## 2018-03-19 PROCEDURE — 99213 OFFICE O/P EST LOW 20 MIN: CPT | Performed by: NURSE PRACTITIONER

## 2018-03-19 PROCEDURE — 73610 X-RAY EXAM OF ANKLE: CPT | Performed by: NURSE PRACTITIONER

## 2018-03-19 RX ORDER — LEVOFLOXACIN 500 MG/1
500 TABLET, FILM COATED ORAL DAILY
Qty: 10 TABLET | Refills: 0 | Status: SHIPPED | OUTPATIENT
Start: 2018-03-19 | End: 2018-11-14

## 2018-03-19 NOTE — PROGRESS NOTES
Jany Bryant is a 44year old female. HPI:   Patient presents today reporting a cough. She reports that she has been sick on and off since 12/2017-she did have a sinus infection and bronchitis 01/2018. She denies any fever or chills.  She reports t Rfl: 1   Mupirocin Calcium 2 % External Cream Apply TID x 7 days. Disp: 15 g Rfl: 1   triamcinolone acetonide 0.1 % External Ointment Apply 1 Application topically 2 (two) times daily.  Disp: 15 g Rfl: 6   Levonorgestrel-Ethinyl Estrad 0.1-20 MG-MCG Oral Ta frontal sinus tenderness noted. Maxillary sinus tenderness noted with palpation. Maxillary sinuses do not transilluminate well. Frontal sinuses transilluminate well.    EYES: sclera clear without injection  NECK: supple,no adenopathy  LUNGS: clear to auscul discomfort.   - XR ANKLE (MIN 3 VIEWS), RIGHT (CPT=73610); Future    The patient indicates understanding of these issues and agrees to the plan. The patient is asked to return if symptoms worsen or fail to improve.  Await XR results for further plan of care

## 2018-05-08 RX ORDER — MONTELUKAST SODIUM 10 MG/1
TABLET ORAL
Qty: 90 TABLET | Refills: 0 | Status: SHIPPED | OUTPATIENT
Start: 2018-05-08 | End: 2018-11-14

## 2018-05-20 ENCOUNTER — HOSPITAL ENCOUNTER (OUTPATIENT)
Age: 38
Discharge: ANOTHER HOSPITAL | End: 2018-05-20
Payer: COMMERCIAL

## 2018-05-20 ENCOUNTER — HOSPITAL ENCOUNTER (EMERGENCY)
Facility: HOSPITAL | Age: 38
Discharge: HOME | End: 2018-05-20
Attending: EMERGENCY MEDICINE
Payer: COMMERCIAL

## 2018-05-20 ENCOUNTER — APPOINTMENT (OUTPATIENT)
Dept: GENERAL RADIOLOGY | Age: 38
End: 2018-05-20
Attending: PHYSICIAN ASSISTANT
Payer: COMMERCIAL

## 2018-05-20 ENCOUNTER — APPOINTMENT (OUTPATIENT)
Dept: CT IMAGING | Facility: HOSPITAL | Age: 38
End: 2018-05-20
Attending: EMERGENCY MEDICINE
Payer: COMMERCIAL

## 2018-05-20 ENCOUNTER — TELEPHONE (OUTPATIENT)
Dept: FAMILY MEDICINE CLINIC | Facility: CLINIC | Age: 38
End: 2018-05-20

## 2018-05-20 VITALS
OXYGEN SATURATION: 100 % | WEIGHT: 180 LBS | BODY MASS INDEX: 30.73 KG/M2 | RESPIRATION RATE: 18 BRPM | TEMPERATURE: 98 F | HEART RATE: 94 BPM | DIASTOLIC BLOOD PRESSURE: 80 MMHG | SYSTOLIC BLOOD PRESSURE: 130 MMHG | HEIGHT: 64 IN

## 2018-05-20 VITALS
SYSTOLIC BLOOD PRESSURE: 128 MMHG | TEMPERATURE: 98 F | OXYGEN SATURATION: 99 % | HEART RATE: 88 BPM | DIASTOLIC BLOOD PRESSURE: 84 MMHG | RESPIRATION RATE: 18 BRPM

## 2018-05-20 DIAGNOSIS — S50.02XA CONTUSION OF LEFT ELBOW, INITIAL ENCOUNTER: ICD-10-CM

## 2018-05-20 DIAGNOSIS — M54.81 OCCIPITAL NEURITIS: ICD-10-CM

## 2018-05-20 DIAGNOSIS — S06.0X0A CONCUSSION WITHOUT LOSS OF CONSCIOUSNESS, INITIAL ENCOUNTER: Primary | ICD-10-CM

## 2018-05-20 DIAGNOSIS — S63.92XA HAND SPRAIN, LEFT, INITIAL ENCOUNTER: ICD-10-CM

## 2018-05-20 DIAGNOSIS — S09.90XA INJURY OF HEAD, INITIAL ENCOUNTER: Primary | ICD-10-CM

## 2018-05-20 PROCEDURE — 73130 X-RAY EXAM OF HAND: CPT | Performed by: PHYSICIAN ASSISTANT

## 2018-05-20 PROCEDURE — 99213 OFFICE O/P EST LOW 20 MIN: CPT

## 2018-05-20 PROCEDURE — 99284 EMERGENCY DEPT VISIT MOD MDM: CPT

## 2018-05-20 PROCEDURE — 70450 CT HEAD/BRAIN W/O DYE: CPT | Performed by: EMERGENCY MEDICINE

## 2018-05-20 PROCEDURE — 73080 X-RAY EXAM OF ELBOW: CPT | Performed by: PHYSICIAN ASSISTANT

## 2018-05-20 RX ORDER — IBUPROFEN 600 MG/1
600 TABLET ORAL ONCE
Status: COMPLETED | OUTPATIENT
Start: 2018-05-20 | End: 2018-05-20

## 2018-05-20 NOTE — ED INITIAL ASSESSMENT (HPI)
Standing on a chair yesterday and fell off  Rolled right ankle and hit head on dining room table  +left hand pain  +nausea  -LOC  Pt c/o head and neck pain

## 2018-05-20 NOTE — ED PROVIDER NOTES
Patient Seen in: [facility] AND [facility] Emergency Department    History   Patient presents with:  Fall (musculoskeletal, neurologic)  Head Neck Injury (neurologic, musculoskeletal)    Stated Complaint: fall    HPI    Patient is a 24-year-old female who fell o Eyes: Conjunctivae, EOM and lids are normal. Pupils are equal, round, and reactive to light. Fundoscopic exam:       The right eye shows no hemorrhage and no papilledema. The left eye shows no hemorrhage and no papilledema. Neck: Neck supple.

## 2018-05-20 NOTE — ED PROVIDER NOTES
Patient Seen in: [address]    History   Patient presents with:  Fall (musculoskeletal, neurologic)    Stated Complaint: HEAD INJURY/ LT HAND INJURY    HPI    Patient is a 42-year-old female who presents for evaluation st BP: 130/80  Pulse: 94  Resp: 18  Temp: 98.1 °F (36.7 °C)  Temp src: Oral  SpO2: 100 %  O2 Device: None (Room air)    Current:/80   Pulse 94   Temp 98.1 °F (36.7 °C) (Oral)   Resp 18   Ht 162.6 cm (5' 4\")   Wt 81.6 kg   LMP 05/20/2018 (Exact Date) CONCLUSION:  1. No acute-appearing fracture or dislocation. No joint effusion. Slight irregularity of the articular surface of the radial head could suggest previous injury.      Dictated by (CST): Sheeba Freedman MD on 5/20/2018 at 11:45     Approved by (CS

## 2018-05-20 NOTE — ED NOTES
Pt sent from [address] [place] [address] for evaluation of a mechanical fall. Pt states she was standing on a chair yesterday and missed a step. Pt injured left wrist and hit head.  Pt denies LOC

## 2018-05-20 NOTE — ED INITIAL ASSESSMENT (HPI)
Pt sent from  care for head injury yesterday. Pt states she fell while standing on a chair and hit her head on a table. Denies loc.

## 2018-07-27 ENCOUNTER — OFFICE VISIT (OUTPATIENT)
Dept: OBGYN CLINIC | Facility: CLINIC | Age: 38
End: 2018-07-27
Payer: COMMERCIAL

## 2018-07-27 VITALS
WEIGHT: 182 LBS | HEART RATE: 81 BPM | DIASTOLIC BLOOD PRESSURE: 77 MMHG | SYSTOLIC BLOOD PRESSURE: 119 MMHG | BODY MASS INDEX: 31 KG/M2

## 2018-07-27 DIAGNOSIS — Z30.41 ENCOUNTER FOR BIRTH CONTROL PILLS MAINTENANCE: ICD-10-CM

## 2018-07-27 DIAGNOSIS — Z30.09 BIRTH CONTROL COUNSELING: ICD-10-CM

## 2018-07-27 DIAGNOSIS — L30.9 VULVAR DERMATITIS: ICD-10-CM

## 2018-07-27 DIAGNOSIS — Z01.419 ENCOUNTER FOR GYNECOLOGICAL EXAMINATION WITHOUT ABNORMAL FINDING: Primary | ICD-10-CM

## 2018-07-27 PROCEDURE — 99395 PREV VISIT EST AGE 18-39: CPT | Performed by: OBSTETRICS & GYNECOLOGY

## 2018-07-27 RX ORDER — LEVONORGESTREL AND ETHINYL ESTRADIOL 0.1-0.02MG
1 KIT ORAL DAILY
Qty: 1 PACKAGE | Refills: 11 | Status: SHIPPED | OUTPATIENT
Start: 2018-07-27 | End: 2019-03-26 | Stop reason: ALTCHOICE

## 2018-07-27 RX ORDER — DEXTROAMPHETAMINE SACCHARATE, AMPHETAMINE ASPARTATE MONOHYDRATE, DEXTROAMPHETAMINE SULFATE AND AMPHETAMINE SULFATE 5; 5; 5; 5 MG/1; MG/1; MG/1; MG/1
CAPSULE, EXTENDED RELEASE ORAL
Refills: 0 | COMMUNITY
Start: 2018-06-18 | End: 2019-03-26 | Stop reason: DRUGHIGH

## 2018-07-28 NOTE — PROGRESS NOTES
Well Woman Exam    HPI:  The patient is a 44yo female who presents for annual exam. She would like and alternate form of contraception. She states she would like an IUD. Reviewed risks, benefits of each option for contraception.  Pt would like Mirena IUD to Grandmother    • Heart Attack Neg    • High Blood Pressure Neg    • High Cholesterol Neg        MEDICATIONS:    Current Outpatient Prescriptions:   •  Amphetamine-Dextroamphet ER 20 MG Oral Capsule SR 24 Hr, , Disp: , Rfl: 0  •  Levonorgestrel-Ethinyl Estr HIVES      Review of Systems:  Constitutional:  Denies fevers and chills   Cardiovascular:  denies chest pain or palpitations  Respiratory:  denies shortness of breath  Gastrointestinal:  denies nausea, vomiting diarrhea or constipation  Genitourinary:  de benefits, indications and alternatives, as well as proper use and common side effects for all were reviewed.   Specifically, irregular bleeding patterns and/or amenorrhea in Mirena/Jocelynn/Kyleena users, and possibly heavier and more painful menses in [place]

## 2018-08-27 ENCOUNTER — TELEPHONE (OUTPATIENT)
Dept: OBGYN CLINIC | Facility: CLINIC | Age: 38
End: 2018-08-27

## 2018-08-29 ENCOUNTER — OFFICE VISIT (OUTPATIENT)
Dept: OBGYN CLINIC | Facility: CLINIC | Age: 38
End: 2018-08-29
Payer: COMMERCIAL

## 2018-08-29 ENCOUNTER — TELEPHONE (OUTPATIENT)
Dept: OBGYN CLINIC | Facility: CLINIC | Age: 38
End: 2018-08-29

## 2018-08-29 VITALS
WEIGHT: 181 LBS | SYSTOLIC BLOOD PRESSURE: 130 MMHG | BODY MASS INDEX: 31 KG/M2 | HEART RATE: 80 BPM | DIASTOLIC BLOOD PRESSURE: 86 MMHG

## 2018-08-29 DIAGNOSIS — Z30.430 ENCOUNTER FOR INSERTION OF INTRAUTERINE CONTRACEPTIVE DEVICE: Primary | ICD-10-CM

## 2018-08-29 PROCEDURE — 58300 INSERT INTRAUTERINE DEVICE: CPT | Performed by: OBSTETRICS & GYNECOLOGY

## 2018-08-29 NOTE — PROCEDURES
IUD Insertion     LMP: 8/29/18  Consent signed. Procedure discussed with the patient in detail including indication, risks, benefits, alternatives and complications.     Pelvic Exam Findings:  Lesion description: 8 week anteverted uterus     Procedure:  Sp

## 2018-08-29 NOTE — TELEPHONE ENCOUNTER
Pt reports period started today and requesting appt for Mirena IUD insertion. (See Kike De La Fuente notes on 7/27/18). Pt accepted appt at 3:40 pm today. Advised pt to take ibuprofen 600 mg with food 30 minutes before appt. Pt verbalized understanding.

## 2018-11-14 ENCOUNTER — TELEPHONE (OUTPATIENT)
Dept: OBGYN CLINIC | Facility: CLINIC | Age: 38
End: 2018-11-14

## 2018-11-14 ENCOUNTER — OFFICE VISIT (OUTPATIENT)
Dept: OBGYN CLINIC | Facility: CLINIC | Age: 38
End: 2018-11-14
Payer: COMMERCIAL

## 2018-11-14 VITALS
SYSTOLIC BLOOD PRESSURE: 126 MMHG | WEIGHT: 184 LBS | DIASTOLIC BLOOD PRESSURE: 84 MMHG | HEART RATE: 92 BPM | BODY MASS INDEX: 32 KG/M2

## 2018-11-14 DIAGNOSIS — Z30.431 IUD CHECK UP: Primary | ICD-10-CM

## 2018-11-14 PROCEDURE — 99213 OFFICE O/P EST LOW 20 MIN: CPT | Performed by: OBSTETRICS & GYNECOLOGY

## 2018-11-14 RX ORDER — LAMOTRIGINE 150 MG/1
150 TABLET ORAL DAILY
Refills: 2 | COMMUNITY
Start: 2018-11-03

## 2018-11-14 RX ORDER — DEXTROAMPHETAMINE SACCHARATE, AMPHETAMINE ASPARTATE MONOHYDRATE, DEXTROAMPHETAMINE SULFATE AND AMPHETAMINE SULFATE 7.5; 7.5; 7.5; 7.5 MG/1; MG/1; MG/1; MG/1
CAPSULE, EXTENDED RELEASE ORAL
Refills: 0 | COMMUNITY
Start: 2018-10-10

## 2018-11-14 RX ORDER — FLUOXETINE HYDROCHLORIDE 40 MG/1
CAPSULE ORAL
Refills: 2 | COMMUNITY
Start: 2018-11-03

## 2018-11-14 RX ORDER — DEXTROAMPHETAMINE SACCHARATE, AMPHETAMINE ASPARTATE, DEXTROAMPHETAMINE SULFATE AND AMPHETAMINE SULFATE 5; 5; 5; 5 MG/1; MG/1; MG/1; MG/1
20 TABLET ORAL DAILY PRN
Refills: 0 | COMMUNITY
Start: 2018-10-10

## 2018-11-14 NOTE — TELEPHONE ENCOUNTER
Pt. Requesting to speak to RN as she thinks that her IUD is out of place. Pt. Has sched an appt for today 11/14 with Dr Afshan Quiles.

## 2018-11-14 NOTE — PROGRESS NOTES
Cintia Perez is a 43year old female [id number] Patient's last menstrual period was 10/02/2018. Patient presents with:  Gyn Problem: IUD check    Patient presents today for IUD check. She had Mirena IUD placed 8/29/18.  She states she has been doing we Transfusions: Not Asked        Caffeine Concern: Yes          3-5 per day         Occupational Exposure: Not Asked        Hobby Hazards: Not Asked        Sleep Concern: Not Asked        Stress Concern: Yes        Weight Concern: Not Asked        Special Roberto Carlton Rfl:     ALLERGIES:    Duricef [Cefadroxil*    HIVES  Egg Phosphatides-Gl*      Gluten Flour              Milk Protein-Mometa*      Sulfa Antibiotics       HIVES  Wellbutrin Xl [Scipio*      Zithromax               HIVES      Review of Systems:  Constitution

## 2018-11-14 NOTE — TELEPHONE ENCOUNTER
PT STATES IUD STRINGS ARE SIGNIFICANTLY LOWER THAN NORMAL AND WAS ALREADY GIVEN AN APPT FOR THIS MORNING. ADVISED TO KEEP APPT.

## 2019-01-14 ENCOUNTER — PATIENT MESSAGE (OUTPATIENT)
Dept: FAMILY MEDICINE CLINIC | Facility: CLINIC | Age: 39
End: 2019-01-14

## 2019-01-14 NOTE — TELEPHONE ENCOUNTER
From: Patricia Smith  To: ALFREDO Bonilla  Sent: 1/14/2019 10:42 AM CST  Subject: Prescription Question    Good morning-    Burnadetariq Bhakta will hopefully recall the terrible time I had with shingles in early 2018.  Last night I woke in the middle of the

## 2019-01-14 NOTE — TELEPHONE ENCOUNTER
Please advise. Would you like patient to make an appointment?      LOV for shingles 12/21/17 and 1/03/18

## 2019-03-26 ENCOUNTER — APPOINTMENT (OUTPATIENT)
Dept: LAB | Age: 39
End: 2019-03-26
Attending: NURSE PRACTITIONER
Payer: COMMERCIAL

## 2019-03-26 ENCOUNTER — OFFICE VISIT (OUTPATIENT)
Dept: FAMILY MEDICINE CLINIC | Facility: CLINIC | Age: 39
End: 2019-03-26
Payer: COMMERCIAL

## 2019-03-26 VITALS
BODY MASS INDEX: 31.41 KG/M2 | WEIGHT: 184 LBS | DIASTOLIC BLOOD PRESSURE: 72 MMHG | RESPIRATION RATE: 16 BRPM | OXYGEN SATURATION: 99 % | TEMPERATURE: 98 F | HEIGHT: 64 IN | SYSTOLIC BLOOD PRESSURE: 100 MMHG | HEART RATE: 86 BPM

## 2019-03-26 DIAGNOSIS — G43.909 MIGRAINE WITHOUT STATUS MIGRAINOSUS, NOT INTRACTABLE, UNSPECIFIED MIGRAINE TYPE: ICD-10-CM

## 2019-03-26 DIAGNOSIS — R25.3 EYELID TWITCH: Primary | ICD-10-CM

## 2019-03-26 DIAGNOSIS — R25.3 EYELID TWITCH: ICD-10-CM

## 2019-03-26 DIAGNOSIS — M54.50 ACUTE BILATERAL LOW BACK PAIN WITHOUT SCIATICA: ICD-10-CM

## 2019-03-26 DIAGNOSIS — J45.20 MILD INTERMITTENT ASTHMA WITHOUT COMPLICATION: ICD-10-CM

## 2019-03-26 LAB
ALBUMIN SERPL-MCNC: 4.2 G/DL (ref 3.4–5)
ALBUMIN/GLOB SERPL: 1.3 {RATIO} (ref 1–2)
ALP LIVER SERPL-CCNC: 95 U/L (ref 37–98)
ALT SERPL-CCNC: 24 U/L (ref 13–56)
ANION GAP SERPL CALC-SCNC: 6 MMOL/L (ref 0–18)
AST SERPL-CCNC: 14 U/L (ref 15–37)
BASOPHILS # BLD AUTO: 0.05 X10(3) UL (ref 0–0.2)
BASOPHILS NFR BLD AUTO: 0.8 %
BILIRUB SERPL-MCNC: 0.5 MG/DL (ref 0.1–2)
BUN BLD-MCNC: 10 MG/DL (ref 7–18)
BUN/CREAT SERPL: 12 (ref 10–20)
CALCIUM BLD-MCNC: 9.6 MG/DL (ref 8.5–10.1)
CHLORIDE SERPL-SCNC: 107 MMOL/L (ref 98–107)
CO2 SERPL-SCNC: 27 MMOL/L (ref 21–32)
CREAT BLD-MCNC: 0.83 MG/DL (ref 0.55–1.02)
DEPRECATED RDW RBC AUTO: 41.5 FL (ref 35.1–46.3)
EOSINOPHIL # BLD AUTO: 0.21 X10(3) UL (ref 0–0.7)
EOSINOPHIL NFR BLD AUTO: 3.5 %
ERYTHROCYTE [DISTWIDTH] IN BLOOD BY AUTOMATED COUNT: 12.5 % (ref 11–15)
GLOBULIN PLAS-MCNC: 3.2 G/DL (ref 2.8–4.4)
GLUCOSE BLD-MCNC: 82 MG/DL (ref 70–99)
HAV IGM SER QL: 2.1 MG/DL (ref 1.6–2.6)
HCT VFR BLD AUTO: 40.8 % (ref 35–48)
HGB BLD-MCNC: 13.8 G/DL (ref 12–16)
IMM GRANULOCYTES # BLD AUTO: 0.03 X10(3) UL (ref 0–1)
IMM GRANULOCYTES NFR BLD: 0.5 %
LYMPHOCYTES # BLD AUTO: 1.21 X10(3) UL (ref 1–4)
LYMPHOCYTES NFR BLD AUTO: 19.9 %
M PROTEIN MFR SERPL ELPH: 7.4 G/DL (ref 6.4–8.2)
MCH RBC QN AUTO: 31 PG (ref 26–34)
MCHC RBC AUTO-ENTMCNC: 33.8 G/DL (ref 31–37)
MCV RBC AUTO: 91.7 FL (ref 80–100)
MONOCYTES # BLD AUTO: 0.58 X10(3) UL (ref 0.1–1)
MONOCYTES NFR BLD AUTO: 9.6 %
NEUTROPHILS # BLD AUTO: 3.99 X10 (3) UL (ref 1.5–7.7)
NEUTROPHILS # BLD AUTO: 3.99 X10(3) UL (ref 1.5–7.7)
NEUTROPHILS NFR BLD AUTO: 65.7 %
OSMOLALITY SERPL CALC.SUM OF ELEC: 288 MOSM/KG (ref 275–295)
PLATELET # BLD AUTO: 326 10(3)UL (ref 150–450)
POTASSIUM SERPL-SCNC: 4.7 MMOL/L (ref 3.5–5.1)
RBC # BLD AUTO: 4.45 X10(6)UL (ref 3.8–5.3)
SED RATE-ML: 6 MM/HR (ref 0–25)
SODIUM SERPL-SCNC: 140 MMOL/L (ref 136–145)
T4 FREE SERPL-MCNC: 0.8 NG/DL (ref 0.8–1.7)
TSI SER-ACNC: 1.17 MIU/ML (ref 0.36–3.74)
WBC # BLD AUTO: 6.1 X10(3) UL (ref 4–11)

## 2019-03-26 PROCEDURE — 99214 OFFICE O/P EST MOD 30 MIN: CPT | Performed by: NURSE PRACTITIONER

## 2019-03-26 RX ORDER — MUPIROCIN CALCIUM 20 MG/G
CREAM TOPICAL
Qty: 15 G | Refills: 0 | Status: SHIPPED | OUTPATIENT
Start: 2019-03-26

## 2019-03-26 RX ORDER — ALBUTEROL SULFATE 90 UG/1
AEROSOL, METERED RESPIRATORY (INHALATION)
Qty: 8.5 G | Refills: 2 | Status: SHIPPED | OUTPATIENT
Start: 2019-03-26 | End: 2019-10-30

## 2019-03-26 RX ORDER — SUMATRIPTAN 100 MG/1
TABLET, FILM COATED ORAL
Qty: 9 TABLET | Refills: 1 | Status: SHIPPED | OUTPATIENT
Start: 2019-03-26 | End: 2020-07-03

## 2019-03-26 NOTE — PROGRESS NOTES
Tang Rosen is a 43year old female. HPI:   Patient presents today reporting right lower eyelid twitching  for the past 1 day.  She reports the twitching started when she woke up and then continued for approximately 2 hours, she reports the twitch tablet Rfl: 1   Mupirocin Calcium 2 % External Cream Apply TID x 7 days.  Disp: 15 g Rfl: 0   FLUoxetine HCl 40 MG Oral Cap TK 1 C PO D Disp:  Rfl: 2   lamoTRIgine 150 MG Oral Tab TK 2 TS PO D Disp:  Rfl: 2   Amphetamine-Dextroamphet ER 30 MG Oral Capsule S injection  NECK: supple,no adenopathy, thyroid normal to palpation  LUNGS: clear to auscultation no rales, rhonchi or wheezes  CARDIO: RRR without murmur  EXTREMITIES: no cyanosis, clubbing or edema +2 posterior tibial pulses.   Musculoskeletal: There is no PANEL (14)  - MAGNESIUM  - PARTH, DIRECT SCREEN; Future  - TSH+FREE T4; Future  - SED RATE, WESTERGREN (AUTOMATED)    2. Migraine without status migrainosus, not intractable, unspecified migraine type  Continue Imitrex as needed. Headache diary. Monitor.

## 2019-03-27 LAB — ANA SCREEN: NEGATIVE

## 2019-06-04 ENCOUNTER — APPOINTMENT (OUTPATIENT)
Dept: GENERAL RADIOLOGY | Facility: HOSPITAL | Age: 39
End: 2019-06-04
Attending: EMERGENCY MEDICINE
Payer: COMMERCIAL

## 2019-06-04 ENCOUNTER — OFFICE VISIT (OUTPATIENT)
Dept: FAMILY MEDICINE CLINIC | Facility: CLINIC | Age: 39
End: 2019-06-04
Payer: COMMERCIAL

## 2019-06-04 ENCOUNTER — HOSPITAL ENCOUNTER (EMERGENCY)
Facility: HOSPITAL | Age: 39
Discharge: HOME | End: 2019-06-04
Attending: EMERGENCY MEDICINE
Payer: COMMERCIAL

## 2019-06-04 VITALS
SYSTOLIC BLOOD PRESSURE: 127 MMHG | TEMPERATURE: 98 F | OXYGEN SATURATION: 97 % | RESPIRATION RATE: 17 BRPM | WEIGHT: 180 LBS | HEIGHT: 64 IN | BODY MASS INDEX: 30.73 KG/M2 | DIASTOLIC BLOOD PRESSURE: 66 MMHG | HEART RATE: 100 BPM

## 2019-06-04 VITALS
HEIGHT: 64 IN | DIASTOLIC BLOOD PRESSURE: 78 MMHG | RESPIRATION RATE: 16 BRPM | SYSTOLIC BLOOD PRESSURE: 126 MMHG | OXYGEN SATURATION: 98 % | BODY MASS INDEX: 34.15 KG/M2 | HEART RATE: 90 BPM | WEIGHT: 200 LBS | TEMPERATURE: 99 F

## 2019-06-04 DIAGNOSIS — M25.511 ACUTE PAIN OF RIGHT SHOULDER: Primary | ICD-10-CM

## 2019-06-04 DIAGNOSIS — M54.9 UPPER BACK PAIN: ICD-10-CM

## 2019-06-04 DIAGNOSIS — M62.89 MUSCLE TIGHTNESS: ICD-10-CM

## 2019-06-04 DIAGNOSIS — M54.50 BACK PAIN OF THORACOLUMBAR REGION: Primary | ICD-10-CM

## 2019-06-04 DIAGNOSIS — M54.6 BACK PAIN OF THORACOLUMBAR REGION: Primary | ICD-10-CM

## 2019-06-04 PROCEDURE — 99283 EMERGENCY DEPT VISIT LOW MDM: CPT

## 2019-06-04 PROCEDURE — 72072 X-RAY EXAM THORAC SPINE 3VWS: CPT | Performed by: EMERGENCY MEDICINE

## 2019-06-04 PROCEDURE — 99214 OFFICE O/P EST MOD 30 MIN: CPT | Performed by: NURSE PRACTITIONER

## 2019-06-04 RX ORDER — TRAMADOL HYDROCHLORIDE 50 MG/1
TABLET ORAL EVERY 4 HOURS PRN
Qty: 12 TABLET | Refills: 0 | Status: SHIPPED | OUTPATIENT
Start: 2019-06-04

## 2019-06-04 RX ORDER — ONDANSETRON 4 MG/1
4 TABLET, ORALLY DISINTEGRATING ORAL ONCE
Status: DISCONTINUED | OUTPATIENT
Start: 2019-06-04 | End: 2019-06-04

## 2019-06-04 RX ORDER — HYDROMORPHONE HYDROCHLORIDE 1 MG/ML
1 INJECTION, SOLUTION INTRAMUSCULAR; INTRAVENOUS; SUBCUTANEOUS ONCE
Status: DISCONTINUED | OUTPATIENT
Start: 2019-06-04 | End: 2019-06-04

## 2019-06-04 RX ORDER — DIAZEPAM 5 MG/1
5 TABLET ORAL 3 TIMES DAILY PRN
Qty: 10 TABLET | Refills: 0 | Status: SHIPPED | OUTPATIENT
Start: 2019-06-04 | End: 2019-06-04

## 2019-06-04 NOTE — ED PROVIDER NOTES
Patient Seen in: [facility] Emergency Department    History   Patient presents with:  Back Pain (musculoskeletal)    Stated Complaint: R shoulder and upper back pain    HPI    Patient is a 39-year-old female presents with back pain.   Complains of pa Moist mucous membranes  Lungs: No tachypnea  Cardiac: No tachycardia  Abdomen: Soft and nontender  Back: No rashes. Skin: No rashes, pallor  Neuro: No focal deficits. Normal gait. Good strength bilateral lower extremities.   Extremities: No cyanosis, lucy

## 2019-06-04 NOTE — PROGRESS NOTES
Joanne Soto is a 43year old female. HPI:   Patient presents today reporting right shoulder pain for the past 3 weeks--started off down near right thumb and radiated upward.  Patient reports that her back went out last night and was unable to slee Rfl: 0   Cholecalciferol (VITAMIN D) 2000 units Oral Cap Take by mouth.  Disp:  Rfl:    omeprazole 20 MG Oral Capsule Delayed Release Take 20 mg by mouth every morning before breakfast. Disp:  Rfl:       Past Medical History:   Diagnosis Date   • DEPRESSION communication skills. ASSESSMENT AND PLAN:     Acute pain of right shoulder  (primary encounter diagnosis)  Upper back pain  Muscle tightness    No orders of the defined types were placed in this encounter.       Meds & Refills for this Visit:  Requested P

## 2019-06-04 NOTE — ED INITIAL ASSESSMENT (HPI)
History     Chief Complaint:  Head Laceration      HPI History obtained through the patient's parent, present at bedside.     Dwain Cowan is a 19 month old male who presents with a facial laceration. The patient was running around at home this evening just prior to arrival when he fell and hit his head on the corner of a fire place, sustaining a laceration just above the left eyebrow. The patient had no loss of consciousness with the incident and has not vomited. Father states that the patient is acting normally. Patient is up to date on his immunizations.     Allergies:  The patient has no known drug allergies.      Medications:    The patient is currently on no regular medications.        Past Medical History:    History reviewed.  No significant past medical history.      Past Surgical History:    Circumcision, infant    Family History:    History reviewed.  No significant family history.      Social History:  Patient presents to the ED with a parent.      The patient is currently up to date with their immunizations.      Review of Systems   Gastrointestinal: Negative for vomiting.   Skin: Positive for wound.   Neurological: Negative for syncope.   All other systems reviewed and are negative.    Physical Exam   First Vitals:  Temp: 98.3  F (36.8  C)  Temp src: Temporal  Resp: 30 Comment: crying  SpO2: 100 %  Weight: 12.2 kg (26 lb 14.3 oz) (06/14 1721-06/14 1745)     Physical Exam  General: Resting comfortably   Head: The scalp, face, and head appear normal   Eyes: The pupils are equal, round, and reactive to light   Conjunctivae normal   ENT: The nose is normal   Ears/pinnae are normal   External acoustic canals are normal   Tympanic membranes are normal   The oropharynx is normal.   Uvula is in the midline.   There is no peritonsillar abscess.   Neck: Normal range of motion.   There is no rigidity. No meningismus.   Trachea is in the midline and normal.   No mass detected.   CV: Regular rate  Pt here for right shoulder and mid back pain x2 weeks with no injury. Worse pain today.  No relief from advil   Normal S1 and S2   Resp: Lungs are clear.   There is no tachypnea; Non-labored   No rales   No wheezing   GI: Abdomen is soft, no rigidity   No distension. No tympani. No rebound tenderness.   Non-surgical without peritoneal features.   MS: No major joint effusions.   Normal motor function to the extremities   Skin: No rash or lesions noted. No petechiae or purpura. Irregular 2 cm diagonal incisional laceration above left eye brow  Neuro: Age appropriate   No focal neurological deficits detected   Psych: Awake. Alert. Appropriate interactions.   Lymph: No anterior or posterior cervical lymphadenopathy noted.    Emergency Department Course   Procedures:     Laceration Repair      LACERATION:  A simple clean 2 cm laceration.    LOCATION:  Left forehead just above the eyebrow.    ANESTHESIA:  LET - Topical.    PREPARATION:  Irrigation and Scrubbing with Normal Saline and Shur Clens.    DEBRIDEMENT:  no debridement.    CLOSURE:  Wound was closed with One Layer.  Skin closed with 3 x 4.0 Nylon using interrupted sutures..     Emergency Department Course:  Nursing notes and vitals reviewed.  I performed an exam of the patient as documented above.  The above workup was undertaken.  1818: I performed the laceration repair as noted above. Father is comfortable with discharge home.     Findings and plan explained to the father. Patient discharged home, status improved, with instructions regarding supportive care, medications, and reasons to return as well as the importance of close follow-up was reviewed.     Impression & Plan    Medical Decision Making:  PECARN neg.  Looks great.  Wound closed easily.  Wound and scarring instructions given.      All questions and concerns were answered. The patient was discharged home and recommended to follow up with his primary physician and return with any new or worsening symptoms.      Diagnosis:    ICD-10-CM   1. Facial laceration, initial encounter S01.81XA        Disposition:  discharged to home    I, Damir Mishra, am serving as a scribe on 6/14/2017 at 5:23 PM to personally document services performed by Bimal Burgess MD, based on my observations and the provider's statements to me.     [facility] EMERGENCY DEPARTMENT         Bimal Burgess MD  06/14/17 [id number]

## 2019-10-21 ENCOUNTER — OFFICE VISIT (OUTPATIENT)
Dept: OBGYN CLINIC | Facility: CLINIC | Age: 39
End: 2019-10-21
Payer: COMMERCIAL

## 2019-10-21 VITALS
HEART RATE: 85 BPM | HEIGHT: 64 IN | DIASTOLIC BLOOD PRESSURE: 84 MMHG | WEIGHT: 199.38 LBS | BODY MASS INDEX: 34.04 KG/M2 | SYSTOLIC BLOOD PRESSURE: 131 MMHG

## 2019-10-21 DIAGNOSIS — Z01.419 ENCOUNTER FOR GYNECOLOGICAL EXAMINATION WITHOUT ABNORMAL FINDING: Primary | ICD-10-CM

## 2019-10-21 DIAGNOSIS — Z30.431 IUD CHECK UP: ICD-10-CM

## 2019-10-21 DIAGNOSIS — Z12.31 ENCOUNTER FOR SCREENING MAMMOGRAM FOR BREAST CANCER: ICD-10-CM

## 2019-10-21 DIAGNOSIS — Z12.4 CERVICAL CANCER SCREENING: ICD-10-CM

## 2019-10-21 PROCEDURE — 99396 PREV VISIT EST AGE 40-64: CPT | Performed by: OBSTETRICS & GYNECOLOGY
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
## 2019-10-28 ENCOUNTER — PATIENT MESSAGE (OUTPATIENT)
Dept: FAMILY MEDICINE CLINIC | Facility: CLINIC | Age: 39
End: 2019-10-28

## 2019-10-28 NOTE — TELEPHONE ENCOUNTER
From: Jose Hayden  To: ALFREDO Madera  Sent: 10/28/2019 9:36 AM CDT  Subject: Other    Here's JJ's costume for anyone that missed it.  Lol

## 2019-10-30 ENCOUNTER — OFFICE VISIT (OUTPATIENT)
Dept: FAMILY MEDICINE CLINIC | Facility: CLINIC | Age: 39
End: 2019-10-30
Payer: COMMERCIAL

## 2019-10-30 VITALS
RESPIRATION RATE: 16 BRPM | WEIGHT: 200 LBS | HEIGHT: 65 IN | TEMPERATURE: 98 F | SYSTOLIC BLOOD PRESSURE: 102 MMHG | HEART RATE: 88 BPM | DIASTOLIC BLOOD PRESSURE: 70 MMHG | BODY MASS INDEX: 33.32 KG/M2

## 2019-10-30 DIAGNOSIS — E01.0 THYROMEGALY: ICD-10-CM

## 2019-10-30 DIAGNOSIS — Z00.00 ANNUAL PHYSICAL EXAM: Primary | ICD-10-CM

## 2019-10-30 DIAGNOSIS — Z13.0 SCREENING FOR ENDOCRINE, METABOLIC AND IMMUNITY DISORDER: ICD-10-CM

## 2019-10-30 DIAGNOSIS — K21.9 GASTROESOPHAGEAL REFLUX DISEASE, ESOPHAGITIS PRESENCE NOT SPECIFIED: ICD-10-CM

## 2019-10-30 DIAGNOSIS — Z13.228 SCREENING FOR ENDOCRINE, METABOLIC AND IMMUNITY DISORDER: ICD-10-CM

## 2019-10-30 DIAGNOSIS — Z13.89 SCREENING FOR GENITOURINARY CONDITION: ICD-10-CM

## 2019-10-30 DIAGNOSIS — F32.A DEPRESSION, UNSPECIFIED DEPRESSION TYPE: ICD-10-CM

## 2019-10-30 DIAGNOSIS — J45.20 MILD INTERMITTENT ASTHMA WITHOUT COMPLICATION: ICD-10-CM

## 2019-10-30 DIAGNOSIS — Z00.00 LABORATORY EXAMINATION ORDERED AS PART OF A ROUTINE GENERAL MEDICAL EXAMINATION: ICD-10-CM

## 2019-10-30 DIAGNOSIS — Z13.29 SCREENING FOR ENDOCRINE, METABOLIC AND IMMUNITY DISORDER: ICD-10-CM

## 2019-10-30 PROCEDURE — 81003 URINALYSIS AUTO W/O SCOPE: CPT | Performed by: NURSE PRACTITIONER

## 2019-10-30 PROCEDURE — 99396 PREV VISIT EST AGE 40-64: CPT | Performed by: NURSE PRACTITIONER

## 2019-10-30 RX ORDER — ALBUTEROL SULFATE 90 UG/1
AEROSOL, METERED RESPIRATORY (INHALATION)
Qty: 8.5 G | Refills: 2 | Status: SHIPPED | OUTPATIENT
Start: 2019-10-30 | End: 2020-07-03

## 2019-10-30 RX ORDER — OMEPRAZOLE 20 MG/1
20 CAPSULE, DELAYED RELEASE ORAL
Qty: 90 CAPSULE | Refills: 1 | Status: SHIPPED | OUTPATIENT
Start: 2019-10-30 | End: 2020-12-12 | Stop reason: DRUGHIGH

## 2019-10-30 NOTE — H&P
HPI:   Joanne Soto is a 35year old female who presents for a complete physical exam. Symptoms: denies discharge, itching, burning or dysuria. She reports last pap smear 2 weeks ago--sees gyne.  Does have history of abnormal pap smear \"a long time Rfl: 2  FLUoxetine HCl 40 MG Oral Cap, TK 1 C PO D, Disp: , Rfl: 2  lamoTRIgine 150 MG Oral Tab, TK 2 TS PO D, Disp: , Rfl: 2  Amphetamine-Dextroamphet ER 30 MG Oral Capsule SR 24 Hr, TK 1 C PO D, Disp: , Rfl: 0  amphetamine-dextroamphetamine 20 MG Oral Ta Minimally--started seeing  last month but stopped due to cost..  Diet: Tries to avoid gluten. Does not eat eggs or dairy- with exception of cheddar cheese. Avoids spicy foods.      REVIEW OF SYSTEMS:   GENERAL: denies fevers, weakness, trouble sleepi edema  NEURO: oriented times three, cranial nerves are grossly intact, no gross motor or sensory deficit.   ASSESSMENT AND PLAN:   Joanne Soto is a 35year old female who presents for a complete physical exam.    Pap and pelvic--up to date per amairani ANTIBODY; Future  - HEP A AB, TOTAL;  Future

## 2019-11-13 ENCOUNTER — OFFICE VISIT (OUTPATIENT)
Dept: FAMILY MEDICINE CLINIC | Facility: CLINIC | Age: 39
End: 2019-11-13
Payer: COMMERCIAL

## 2019-11-13 VITALS
RESPIRATION RATE: 16 BRPM | HEIGHT: 64 IN | HEART RATE: 84 BPM | TEMPERATURE: 99 F | WEIGHT: 200 LBS | SYSTOLIC BLOOD PRESSURE: 124 MMHG | BODY MASS INDEX: 34.15 KG/M2 | DIASTOLIC BLOOD PRESSURE: 80 MMHG

## 2019-11-13 DIAGNOSIS — F41.9 ANXIETY: ICD-10-CM

## 2019-11-13 DIAGNOSIS — F32.A DEPRESSION, UNSPECIFIED DEPRESSION TYPE: Primary | ICD-10-CM

## 2019-11-13 PROCEDURE — 99214 OFFICE O/P EST MOD 30 MIN: CPT | Performed by: NURSE PRACTITIONER

## 2019-11-13 NOTE — PROGRESS NOTES
Breana Lopez is a 35year old female. HPI:   Patient presents today to discuss plan with her overall mental health. Patient has been seeing psychiatrist- Dr. Mireille Hercules who has been managing her medications for several months.  She sees her every 3 mon Mupirocin Calcium 2 % External Cream Apply TID x 7 days. (Patient taking differently: 3 (three) times daily as needed. Apply TID x 7 days.  ) 15 g 0   • FLUoxetine HCl 40 MG Oral Cap TK 1 C PO D  2   • lamoTRIgine 150 MG Oral Tab TK 2 TS PO D  2   • Ampheta were placed in this encounter. Meds & Refills for this Visit:  Requested Prescriptions      No prescriptions requested or ordered in this encounter       Imaging & Consults:  None    Follow Up with:  No follow-up provider specified.      1. Depression,

## 2019-11-15 ENCOUNTER — PATIENT MESSAGE (OUTPATIENT)
Dept: FAMILY MEDICINE CLINIC | Facility: CLINIC | Age: 39
End: 2019-11-15

## 2019-11-15 DIAGNOSIS — S06.0X9D CONCUSSION WITH LOSS OF CONSCIOUSNESS, SUBSEQUENT ENCOUNTER: Primary | ICD-10-CM

## 2019-11-15 DIAGNOSIS — S06.0X0A CONCUSSION WITHOUT LOSS OF CONSCIOUSNESS, INITIAL ENCOUNTER: ICD-10-CM

## 2019-11-15 DIAGNOSIS — S06.0X0D CONCUSSION WITHOUT LOSS OF CONSCIOUSNESS, SUBSEQUENT ENCOUNTER: ICD-10-CM

## 2019-11-15 NOTE — TELEPHONE ENCOUNTER
From: Adriana Bassett  To: ALFREDO Mendiola  Sent: 11/15/2019 6:08 AM CST  Subject: Visit Follow-up Question    Alfonzo Saunders (and my favorite nurse team)-    As a follow-up to [facility] appointment this week- I am curious if an MRI may be helpful at this po

## 2019-11-15 NOTE — TELEPHONE ENCOUNTER
I spoke with patient, information and recommendations discussed with patient, voiced understanding, agreed with plan. Order entered.

## 2019-12-30 ENCOUNTER — TELEPHONE (OUTPATIENT)
Dept: OBGYN CLINIC | Facility: CLINIC | Age: 39
End: 2019-12-30

## 2019-12-30 NOTE — TELEPHONE ENCOUNTER
Received refill request for Triamcinolone ointment. RX faxed back denied with instructions for pt to call office.

## 2020-01-03 ENCOUNTER — HOSPITAL ENCOUNTER (OUTPATIENT)
Dept: ULTRASOUND IMAGING | Facility: HOSPITAL | Age: 40
Discharge: HOME | End: 2020-01-03
Attending: NURSE PRACTITIONER
Payer: COMMERCIAL

## 2020-01-03 ENCOUNTER — HOSPITAL ENCOUNTER (OUTPATIENT)
Dept: MAMMOGRAPHY | Facility: HOSPITAL | Age: 40
Discharge: HOME | End: 2020-01-03
Attending: OBSTETRICS & GYNECOLOGY
Payer: COMMERCIAL

## 2020-01-03 DIAGNOSIS — Z12.31 ENCOUNTER FOR SCREENING MAMMOGRAM FOR BREAST CANCER: ICD-10-CM

## 2020-01-03 DIAGNOSIS — E01.0 THYROMEGALY: ICD-10-CM

## 2020-01-03 PROCEDURE — 77067 SCR MAMMO BI INCL CAD: CPT | Performed by: OBSTETRICS & GYNECOLOGY

## 2020-01-03 PROCEDURE — 76536 US EXAM OF HEAD AND NECK: CPT | Performed by: NURSE PRACTITIONER

## 2020-01-03 PROCEDURE — 77063 BREAST TOMOSYNTHESIS BI: CPT | Performed by: OBSTETRICS & GYNECOLOGY

## 2020-01-15 ENCOUNTER — APPOINTMENT (OUTPATIENT)
Dept: LAB | Age: 40
End: 2020-01-15
Attending: ANESTHESIOLOGY
Payer: COMMERCIAL

## 2020-01-15 DIAGNOSIS — Z13.228 SCREENING FOR ENDOCRINE, METABOLIC AND IMMUNITY DISORDER: ICD-10-CM

## 2020-01-15 DIAGNOSIS — Z13.0 SCREENING FOR ENDOCRINE, METABOLIC AND IMMUNITY DISORDER: ICD-10-CM

## 2020-01-15 DIAGNOSIS — Z13.29 SCREENING FOR ENDOCRINE, METABOLIC AND IMMUNITY DISORDER: ICD-10-CM

## 2020-01-15 DIAGNOSIS — Z00.00 LABORATORY EXAMINATION ORDERED AS PART OF A ROUTINE GENERAL MEDICAL EXAMINATION: ICD-10-CM

## 2020-01-15 DIAGNOSIS — Z00.00 ANNUAL PHYSICAL EXAM: ICD-10-CM

## 2020-01-15 LAB
CHOLEST SMN-MCNC: 216 MG/DL (ref ?–200)
HAV AB SER QL IA: NONREACTIVE
HBV SURFACE AB SER QL: REACTIVE
HBV SURFACE AB SERPL IA-ACNC: 37.65 MIU/ML
HDLC SERPL-MCNC: 48 MG/DL (ref 40–59)
LDLC SERPL CALC-MCNC: 152 MG/DL (ref ?–100)
NONHDLC SERPL-MCNC: 168 MG/DL (ref ?–130)
PATIENT FASTING Y/N/NP: YES
RUBV IGG SER QL: POSITIVE
RUBV IGG SER-ACNC: 53.9 IU/ML (ref 10–?)
TRIGL SERPL-MCNC: 82 MG/DL (ref 30–149)
VLDLC SERPL CALC-MCNC: 16 MG/DL (ref 0–30)

## 2020-01-15 PROCEDURE — 86762 RUBELLA ANTIBODY: CPT

## 2020-01-15 PROCEDURE — 86706 HEP B SURFACE ANTIBODY: CPT

## 2020-01-15 PROCEDURE — 86735 MUMPS ANTIBODY: CPT

## 2020-01-15 PROCEDURE — 86765 RUBEOLA ANTIBODY: CPT

## 2020-01-15 PROCEDURE — 86708 HEPATITIS A ANTIBODY: CPT

## 2020-01-15 PROCEDURE — 36415 COLL VENOUS BLD VENIPUNCTURE: CPT

## 2020-01-15 PROCEDURE — 80061 LIPID PANEL: CPT

## 2020-01-17 LAB
MEV IGG SER-ACNC: 108 AU/ML (ref 16.5–?)
MUV IGG SER IA-ACNC: <5 AU/ML (ref 11–?)

## 2020-01-24 ENCOUNTER — OFFICE VISIT (OUTPATIENT)
Dept: FAMILY MEDICINE CLINIC | Facility: CLINIC | Age: 40
End: 2020-01-24
Payer: COMMERCIAL

## 2020-01-24 ENCOUNTER — APPOINTMENT (OUTPATIENT)
Dept: LAB | Age: 40
End: 2020-01-24
Attending: NURSE PRACTITIONER
Payer: COMMERCIAL

## 2020-01-24 VITALS
HEART RATE: 80 BPM | WEIGHT: 200 LBS | DIASTOLIC BLOOD PRESSURE: 68 MMHG | HEIGHT: 64 IN | BODY MASS INDEX: 34.15 KG/M2 | RESPIRATION RATE: 18 BRPM | TEMPERATURE: 99 F | SYSTOLIC BLOOD PRESSURE: 112 MMHG

## 2020-01-24 DIAGNOSIS — E53.8 VITAMIN B12 DEFICIENCY: ICD-10-CM

## 2020-01-24 DIAGNOSIS — F32.A DEPRESSION, UNSPECIFIED DEPRESSION TYPE: ICD-10-CM

## 2020-01-24 DIAGNOSIS — E78.5 DYSLIPIDEMIA: Primary | ICD-10-CM

## 2020-01-24 DIAGNOSIS — F41.9 ANXIETY: ICD-10-CM

## 2020-01-24 DIAGNOSIS — E55.9 VITAMIN D DEFICIENCY: ICD-10-CM

## 2020-01-24 DIAGNOSIS — Z23 NEED FOR VACCINATION: ICD-10-CM

## 2020-01-24 DIAGNOSIS — Z71.85 VACCINE COUNSELING: ICD-10-CM

## 2020-01-24 LAB
VIT B12 SERPL-MCNC: 249 PG/ML (ref 193–986)
VIT D+METAB SERPL-MCNC: 24.8 NG/ML (ref 30–100)

## 2020-01-24 PROCEDURE — 90707 MMR VACCINE SC: CPT | Performed by: NURSE PRACTITIONER

## 2020-01-24 PROCEDURE — 90471 IMMUNIZATION ADMIN: CPT | Performed by: NURSE PRACTITIONER

## 2020-01-24 PROCEDURE — 82306 VITAMIN D 25 HYDROXY: CPT

## 2020-01-24 PROCEDURE — 90632 HEPA VACCINE ADULT IM: CPT | Performed by: NURSE PRACTITIONER

## 2020-01-24 PROCEDURE — 99214 OFFICE O/P EST MOD 30 MIN: CPT | Performed by: NURSE PRACTITIONER

## 2020-01-24 PROCEDURE — 36415 COLL VENOUS BLD VENIPUNCTURE: CPT

## 2020-01-24 PROCEDURE — 90472 IMMUNIZATION ADMIN EACH ADD: CPT | Performed by: NURSE PRACTITIONER

## 2020-01-24 PROCEDURE — 82607 VITAMIN B-12: CPT

## 2020-01-24 RX ORDER — ATORVASTATIN CALCIUM 10 MG/1
10 TABLET, FILM COATED ORAL NIGHTLY
Qty: 90 TABLET | Refills: 0 | Status: SHIPPED | OUTPATIENT
Start: 2020-01-24 | End: 2020-07-22

## 2020-01-24 NOTE — PROGRESS NOTES
Ben Berry is a 35year old female. HPI:   Patient presents today for a follow up on recent lab work she had completed. Total cholesterol and LDL were elevated. She reports a family history of elevated cholesterol.  She has been trying to eat bet lamoTRIgine 150 MG Oral Tab TK 2 TS PO D  2   • Amphetamine-Dextroamphet ER 30 MG Oral Capsule SR 24 Hr TK 1 C PO D  0   • amphetamine-dextroamphetamine 20 MG Oral Tab TK 1 T PO BID  0   • triamcinolone acetonide 0.1 % External Ointment Apply 1 Application sounds ×4 quadrant, no hepatosplenomegaly or masses, and no tenderness   EXTREMITIES: no cyanosis, clubbing or edema +2 posterior tibial pulses. Musculoskeletal: No gross deficit. No calf tenderness with palpation.   Neurological: nerves II through XII luna . Denies any side effects. Denies any thoughts of harming self/others. 4. BMI 34.0-34.9,adult  Discussed healthy eating, exercise and weight loss. Advised on weight loss. Monitor.      5. Vitamin D deficiency  - VITAMIN D, 25-HYDROXY; Future

## 2020-01-26 ENCOUNTER — HOSPITAL ENCOUNTER (OUTPATIENT)
Age: 40
Discharge: HOME | End: 2020-01-26
Attending: EMERGENCY MEDICINE
Payer: COMMERCIAL

## 2020-01-26 ENCOUNTER — APPOINTMENT (OUTPATIENT)
Dept: GENERAL RADIOLOGY | Age: 40
End: 2020-01-26
Attending: EMERGENCY MEDICINE
Payer: COMMERCIAL

## 2020-01-26 VITALS
SYSTOLIC BLOOD PRESSURE: 98 MMHG | TEMPERATURE: 98 F | WEIGHT: 200 LBS | RESPIRATION RATE: 16 BRPM | DIASTOLIC BLOOD PRESSURE: 60 MMHG | OXYGEN SATURATION: 99 % | HEIGHT: 64 IN | HEART RATE: 73 BPM | BODY MASS INDEX: 34.15 KG/M2

## 2020-01-26 DIAGNOSIS — S90.121A CONTUSION OF FIFTH TOE OF RIGHT FOOT, INITIAL ENCOUNTER: Primary | ICD-10-CM

## 2020-01-26 PROCEDURE — 99213 OFFICE O/P EST LOW 20 MIN: CPT

## 2020-01-26 PROCEDURE — 73630 X-RAY EXAM OF FOOT: CPT | Performed by: EMERGENCY MEDICINE

## 2020-01-26 NOTE — ED INITIAL ASSESSMENT (HPI)
Patient states she slipped on ice on Thursday and has been slipping on ice daily, states last night her right foot was very painfu and swollen from great toe to mid foot.   Patient states her sister's 150lb dog stepped on her right foot and started feeling

## 2020-01-26 NOTE — ED PROVIDER NOTES
Patient Seen in: [address]      History   Patient presents with: Foot Injury    Stated Complaint: right foot injury    HPI    Patient is a 44-year-old female presents to immediate care complaining of right foot pain. Appearance: Normal appearance. HENT:      Head: Normocephalic and atraumatic. Mouth/Throat:      Mouth: Mucous membranes are moist.   Eyes:      Extraocular Movements: Extraocular movements intact.       Pupils: Pupils are equal, round, and reacti

## 2020-02-04 ENCOUNTER — NURSE ONLY (OUTPATIENT)
Dept: FAMILY MEDICINE CLINIC | Facility: CLINIC | Age: 40
End: 2020-02-04
Payer: COMMERCIAL

## 2020-02-04 DIAGNOSIS — E53.8 VITAMIN B12 DEFICIENCY: Primary | ICD-10-CM

## 2020-02-04 PROCEDURE — 96372 THER/PROPH/DIAG INJ SC/IM: CPT | Performed by: NURSE PRACTITIONER

## 2020-02-04 RX ORDER — CYANOCOBALAMIN 1000 UG/ML
1000 INJECTION INTRAMUSCULAR; SUBCUTANEOUS ONCE
Status: COMPLETED | OUTPATIENT
Start: 2020-02-04 | End: 2020-02-04

## 2020-02-04 RX ADMIN — CYANOCOBALAMIN 1000 MCG: 1000 INJECTION INTRAMUSCULAR; SUBCUTANEOUS at 09:01:00

## 2020-02-04 NOTE — PROGRESS NOTES
Pt was seen today for a Vitamin B12 injection. This is the first of three over a 3 month period. Injection given, pt handled well. Advised pt to return in one month. Pt voiced understanding and agreed to plan.

## 2020-03-17 ENCOUNTER — PATIENT MESSAGE (OUTPATIENT)
Dept: OBGYN CLINIC | Facility: CLINIC | Age: 40
End: 2020-03-17

## 2020-03-17 RX ORDER — MUPIROCIN CALCIUM 20 MG/G
CREAM TOPICAL
Qty: 30 G | Refills: 0 | OUTPATIENT
Start: 2020-03-17

## 2020-03-17 NOTE — TELEPHONE ENCOUNTER
Message to Keily Mcneil for refill. Pts last annual was 10/2019. Last mammo 1/3/2020-bilateral cat 1 negative.

## 2020-03-17 NOTE — TELEPHONE ENCOUNTER
When I entered medication for Triamcinolone, pop up states allergy/contraind. - Asmanex. Medication is pended. Please send if you wish or give recs.

## 2020-03-17 NOTE — TELEPHONE ENCOUNTER
From: Chas Smith  To:  Carmelo Gary MD  Sent: 3/17/2020 8:26 AM CDT  Subject: Prescription Question    Good morning- I'm submitting a request to refill my prescriptions at [facility] in case pharmacy's close during this pandemic and was looking

## 2020-03-17 NOTE — TELEPHONE ENCOUNTER
Spoke to pt. Pt states using cream for cut or something that looks like it may be infected. Per Sean Arzola, [facility] for pt to use neosporin instead. Refill denied.

## 2020-04-21 ENCOUNTER — LAB ENCOUNTER (OUTPATIENT)
Dept: LAB | Facility: HOSPITAL | Age: 40
End: 2020-04-21
Attending: OBSTETRICS & GYNECOLOGY
Payer: COMMERCIAL

## 2020-04-21 DIAGNOSIS — E78.5 DYSLIPIDEMIA: ICD-10-CM

## 2020-04-21 DIAGNOSIS — E55.9 VITAMIN D DEFICIENCY: ICD-10-CM

## 2020-04-21 DIAGNOSIS — E53.8 VITAMIN B12 DEFICIENCY: ICD-10-CM

## 2020-04-21 DIAGNOSIS — Z11.3 SCREEN FOR STD (SEXUALLY TRANSMITTED DISEASE): ICD-10-CM

## 2020-04-21 PROCEDURE — 86803 HEPATITIS C AB TEST: CPT

## 2020-04-21 PROCEDURE — 87491 CHLMYD TRACH DNA AMP PROBE: CPT

## 2020-04-21 PROCEDURE — 82607 VITAMIN B-12: CPT

## 2020-04-21 PROCEDURE — 82306 VITAMIN D 25 HYDROXY: CPT

## 2020-04-21 PROCEDURE — 36415 COLL VENOUS BLD VENIPUNCTURE: CPT

## 2020-04-21 PROCEDURE — 80053 COMPREHEN METABOLIC PANEL: CPT

## 2020-04-21 PROCEDURE — 87340 HEPATITIS B SURFACE AG IA: CPT

## 2020-04-21 PROCEDURE — 87389 HIV-1 AG W/HIV-1&-2 AB AG IA: CPT

## 2020-04-21 PROCEDURE — 86780 TREPONEMA PALLIDUM: CPT

## 2020-04-21 PROCEDURE — 87591 N.GONORRHOEAE DNA AMP PROB: CPT

## 2020-04-22 ENCOUNTER — TELEPHONE (OUTPATIENT)
Dept: FAMILY MEDICINE CLINIC | Facility: CLINIC | Age: 40
End: 2020-04-22

## 2020-04-22 DIAGNOSIS — E53.8 VITAMIN B12 DEFICIENCY: Primary | ICD-10-CM

## 2020-04-22 DIAGNOSIS — E55.9 VITAMIN D DEFICIENCY: Primary | ICD-10-CM

## 2020-04-22 RX ORDER — ERGOCALCIFEROL 1.25 MG/1
50000 CAPSULE ORAL WEEKLY
Qty: 12 CAPSULE | Refills: 0 | Status: SHIPPED | OUTPATIENT
Start: 2020-04-22 | End: 2020-07-09

## 2020-05-08 ENCOUNTER — TELEPHONE (OUTPATIENT)
Dept: OBGYN CLINIC | Facility: CLINIC | Age: 40
End: 2020-05-08

## 2020-05-14 ENCOUNTER — PATIENT MESSAGE (OUTPATIENT)
Dept: FAMILY MEDICINE CLINIC | Facility: CLINIC | Age: 40
End: 2020-05-14

## 2020-05-14 NOTE — TELEPHONE ENCOUNTER
From: Kee Smith  To: ALFREDO Kumar  Sent: 5/14/2020 3:17 PM CDT  Subject: Test Results Question    Good afternoon Nicky- I hope that you are staying safe and healthy. Question.     I sprained my foot the end of January (xrays taken at [facility]

## 2020-05-20 ENCOUNTER — HOSPITAL ENCOUNTER (OUTPATIENT)
Dept: GENERAL RADIOLOGY | Age: 40
Discharge: HOME | End: 2020-05-20
Attending: NURSE PRACTITIONER
Payer: COMMERCIAL

## 2020-05-20 ENCOUNTER — OFFICE VISIT (OUTPATIENT)
Dept: FAMILY MEDICINE CLINIC | Facility: CLINIC | Age: 40
End: 2020-05-20
Payer: COMMERCIAL

## 2020-05-20 VITALS
DIASTOLIC BLOOD PRESSURE: 78 MMHG | TEMPERATURE: 98 F | BODY MASS INDEX: 34.49 KG/M2 | WEIGHT: 202 LBS | SYSTOLIC BLOOD PRESSURE: 124 MMHG | HEIGHT: 64 IN | HEART RATE: 96 BPM | RESPIRATION RATE: 18 BRPM

## 2020-05-20 DIAGNOSIS — M25.511 ACUTE PAIN OF RIGHT SHOULDER: ICD-10-CM

## 2020-05-20 DIAGNOSIS — M25.521 RIGHT ELBOW PAIN: ICD-10-CM

## 2020-05-20 DIAGNOSIS — M79.671 RIGHT FOOT PAIN: Primary | ICD-10-CM

## 2020-05-20 PROCEDURE — 3078F DIAST BP <80 MM HG: CPT | Performed by: NURSE PRACTITIONER

## 2020-05-20 PROCEDURE — 99214 OFFICE O/P EST MOD 30 MIN: CPT | Performed by: NURSE PRACTITIONER

## 2020-05-20 PROCEDURE — 3074F SYST BP LT 130 MM HG: CPT | Performed by: NURSE PRACTITIONER

## 2020-05-20 PROCEDURE — 73030 X-RAY EXAM OF SHOULDER: CPT | Performed by: NURSE PRACTITIONER

## 2020-05-20 PROCEDURE — 3008F BODY MASS INDEX DOCD: CPT | Performed by: NURSE PRACTITIONER

## 2020-05-20 PROCEDURE — 73080 X-RAY EXAM OF ELBOW: CPT | Performed by: NURSE PRACTITIONER

## 2020-05-20 RX ORDER — TRAZODONE HYDROCHLORIDE 50 MG/1
TABLET ORAL
COMMUNITY
Start: 2020-01-28

## 2020-05-20 RX ORDER — ZOLPIDEM TARTRATE 5 MG/1
5 TABLET ORAL NIGHTLY PRN
COMMUNITY
Start: 2020-05-13

## 2020-05-20 NOTE — PROGRESS NOTES
Judi Mora is a 35year old female. HPI:   Patient presents today to discuss 2 main concerns:    1.  Patient reports that she sprained her foot back in January 2020- was seen [facility], had XR completed- showed soft tissue swelling otherw ergocalciferol 1.25 MG (57678 UT) Oral Cap Take 1 capsule (50,000 Units total) by mouth once a week for 12 doses. 12 capsule 0   • cyanocobalamin 1000 MCG Oral Tab Take 1 tablet (1,000 mcg total) by mouth daily.   0   • atorvastatin (LIPITOR) 10 MG Oral Tab breath with exertion  CARDIOVASCULAR: denies chest pain on exertion  NEURO: denies headaches  Musculoskeletal: reports right elbow, right shoulder, right foot pain.  SEE HPI  EXAM:   /78 (BP Location: Right arm, Patient Position: Sitting, Cuff Size: a Future    2. Acute pain of right shoulder  XR shoulder as ordered. Referral PT. Ok for OTC Advil 600 mg TID with food. If no improvement- MRI. - XR SHOULDER, COMPLETE (MIN 2 VIEWS), RIGHT (CPT=73030);  Future  - [facility]

## 2020-06-23 DIAGNOSIS — J45.20 MILD INTERMITTENT ASTHMA WITHOUT COMPLICATION: ICD-10-CM

## 2020-06-23 DIAGNOSIS — G43.909 MIGRAINE WITHOUT STATUS MIGRAINOSUS, NOT INTRACTABLE, UNSPECIFIED MIGRAINE TYPE: ICD-10-CM

## 2020-07-03 RX ORDER — ALBUTEROL SULFATE 90 UG/1
AEROSOL, METERED RESPIRATORY (INHALATION)
Qty: 8.5 G | Refills: 0 | Status: SHIPPED | OUTPATIENT
Start: 2020-07-03

## 2020-07-03 RX ORDER — SUMATRIPTAN 100 MG/1
TABLET, FILM COATED ORAL
Qty: 9 TABLET | Refills: 0 | Status: SHIPPED | OUTPATIENT
Start: 2020-07-03

## 2020-07-19 DIAGNOSIS — E78.5 DYSLIPIDEMIA: ICD-10-CM

## 2020-07-22 RX ORDER — ATORVASTATIN CALCIUM 10 MG/1
TABLET, FILM COATED ORAL
Qty: 30 TABLET | Refills: 0 | Status: SHIPPED | OUTPATIENT
Start: 2020-07-22

## 2020-12-12 ENCOUNTER — TELEPHONE (OUTPATIENT)
Dept: FAMILY MEDICINE CLINIC | Facility: CLINIC | Age: 40
End: 2020-12-12

## 2020-12-12 DIAGNOSIS — K21.9 GASTROESOPHAGEAL REFLUX DISEASE, UNSPECIFIED WHETHER ESOPHAGITIS PRESENT: Primary | ICD-10-CM

## 2020-12-12 DIAGNOSIS — Z79.899 MEDICATION MANAGEMENT: ICD-10-CM

## 2020-12-12 PROCEDURE — 99213 OFFICE O/P EST LOW 20 MIN: CPT | Performed by: FAMILY MEDICINE

## 2020-12-12 RX ORDER — OMEPRAZOLE 40 MG/1
40 CAPSULE, DELAYED RELEASE ORAL 2 TIMES DAILY
Qty: 180 CAPSULE | Refills: 0 | Status: SHIPPED | OUTPATIENT
Start: 2020-12-12 | End: 2021-12-07

## 2020-12-12 NOTE — TELEPHONE ENCOUNTER
Virtual Telephone Check-In    Conner Godfrey verbally consents to a Virtual/Telephone Check-In visit on 12/12/20. Patient has been referred to the [facility] website at [email or web address] to review the yearly Consent to Treat document.     Patient un Disp: , Rfl: 2    •  lamoTRIgine 150 MG Oral Tab, TK 2 TS PO D, Disp: , Rfl: 2    •  Amphetamine-Dextroamphet ER 30 MG Oral Capsule SR 24 Hr, TK 1 C PO D, Disp: , Rfl: 0    •  amphetamine-dextroamphetamine 20 MG Oral Tab, Take 20 mg by mouth daily as neede 8.9 8.5 - 10.1 mg/dL    Calculated Osmolality 289 275 - 295 mOsm/kg    GFR, Non- 90 >=60    GFR, -American 104 >=60    ALT 24 13 - 56 U/L    AST 15 15 - 37 U/L    Alkaline Phosphatase 95 37 - 98 U/L    Bilirubin, Total 0.3 0.1 - 2.0 diagnosis)  Medication management    No orders of the defined types were placed in this encounter.       Meds & Refills for this Visit:  Requested Prescriptions     Signed Prescriptions Disp Refills   • Omeprazole 40 MG Oral Capsule Delayed Release 180 caps

## 2021-01-05 ENCOUNTER — OFFICE VISIT (OUTPATIENT)
Dept: OBGYN CLINIC | Facility: CLINIC | Age: 41
End: 2021-01-05
Payer: COMMERCIAL

## 2021-01-05 VITALS
HEART RATE: 85 BPM | WEIGHT: 209.81 LBS | SYSTOLIC BLOOD PRESSURE: 128 MMHG | DIASTOLIC BLOOD PRESSURE: 82 MMHG | BODY MASS INDEX: 36 KG/M2

## 2021-01-05 DIAGNOSIS — Z12.31 ENCOUNTER FOR SCREENING MAMMOGRAM FOR BREAST CANCER: ICD-10-CM

## 2021-01-05 DIAGNOSIS — Z01.419 ENCOUNTER FOR GYNECOLOGICAL EXAMINATION WITHOUT ABNORMAL FINDING: Primary | ICD-10-CM

## 2021-01-05 DIAGNOSIS — Z30.431 IUD CHECK UP: ICD-10-CM

## 2021-01-05 PROCEDURE — 3074F SYST BP LT 130 MM HG: CPT | Performed by: OBSTETRICS & GYNECOLOGY

## 2021-01-05 PROCEDURE — 99396 PREV VISIT EST AGE 40-64: CPT | Performed by: OBSTETRICS & GYNECOLOGY

## 2021-01-05 PROCEDURE — 3079F DIAST BP 80-89 MM HG: CPT | Performed by: OBSTETRICS & GYNECOLOGY

## 2021-01-05 NOTE — PROGRESS NOTES
Well Woman Exam    HPI:  The patient is a 42yo female who presents today for annual exam. She has no complaints. She does note more KAYLI. She is happy with Mirena IUD.      Reviewed medical and surgical history below   OBSTETRICS HISTORY:  OB History   G2  P Birth control/protection: Mirena, I.U.D.     Lifestyle      Physical activity        Days per week: Not on file        Minutes per session: Not on file      Stress: Not on file    Relationships      Social connections        Talks on phone: Not on file BEDTIME, Disp: 30 tablet, Rfl: 0  •  Albuterol Sulfate HFA (PROAIR HFA) 108 (90 Base) MCG/ACT Inhalation Aero Soln, INHALE 2 PUFFS BY MOUTH EVERY 4 HOURS AS NEEDED FOR WHEEZING, Disp: 8.5 g, Rfl: 0  •  SUMAtriptan Succinate 100 MG Oral Tab, TAKE 1 TABLET B Systems:  Constitutional:  Denies fevers and chills   Cardiovascular:  denies chest pain or palpitations  Respiratory:  denies shortness of breath  Gastrointestinal:  denies nausea, vomiting, diarrhea  Genitourinary:  denies dysuria, abnormal discharge   H appropriate screening intervals for the individual  2. Reviewed breast self awareness   3. Mammogram ordered   4. Family history of breast cancer in maternal aunt X4 and cousin. Asked if genetically tested and patient is going to find out   4. KAYLI  1.  Revi

## 2021-05-14 ENCOUNTER — HOSPITAL ENCOUNTER (OUTPATIENT)
Dept: MAMMOGRAPHY | Age: 41
Discharge: HOME | End: 2021-05-14
Attending: OBSTETRICS & GYNECOLOGY
Payer: COMMERCIAL

## 2021-05-14 DIAGNOSIS — Z12.31 ENCOUNTER FOR SCREENING MAMMOGRAM FOR BREAST CANCER: ICD-10-CM

## 2021-05-14 PROCEDURE — 77067 SCR MAMMO BI INCL CAD: CPT | Performed by: OBSTETRICS & GYNECOLOGY

## 2021-05-14 PROCEDURE — 77063 BREAST TOMOSYNTHESIS BI: CPT | Performed by: OBSTETRICS & GYNECOLOGY

## 2021-05-26 ENCOUNTER — TELEPHONE (OUTPATIENT)
Dept: OBGYN CLINIC | Facility: CLINIC | Age: 41
End: 2021-05-26

## 2021-05-26 ENCOUNTER — HOSPITAL ENCOUNTER (OUTPATIENT)
Dept: ULTRASOUND IMAGING | Facility: HOSPITAL | Age: 41
Discharge: HOME | End: 2021-05-26
Attending: OBSTETRICS & GYNECOLOGY
Payer: COMMERCIAL

## 2021-05-26 ENCOUNTER — HOSPITAL ENCOUNTER (OUTPATIENT)
Dept: MAMMOGRAPHY | Facility: HOSPITAL | Age: 41
Discharge: HOME | End: 2021-05-26
Attending: OBSTETRICS & GYNECOLOGY
Payer: COMMERCIAL

## 2021-05-26 DIAGNOSIS — R92.8 ABNORMAL MAMMOGRAM: ICD-10-CM

## 2021-05-26 DIAGNOSIS — N60.01 BREAST CYST, RIGHT: Primary | ICD-10-CM

## 2021-05-26 PROCEDURE — 77061 BREAST TOMOSYNTHESIS UNI: CPT | Performed by: OBSTETRICS & GYNECOLOGY

## 2021-05-26 PROCEDURE — 77065 DX MAMMO INCL CAD UNI: CPT | Performed by: OBSTETRICS & GYNECOLOGY

## 2021-05-26 PROCEDURE — 76642 ULTRASOUND BREAST LIMITED: CPT | Performed by: OBSTETRICS & GYNECOLOGY

## 2021-05-26 NOTE — TELEPHONE ENCOUNTER
----- Message from Rodríguez Borrego MD sent at 5/26/2021 12:00 PM CDT -----  Additional views-- breast cysts.   Will need 6 month right mammogram and ultrasound

## 2021-07-14 ENCOUNTER — TELEPHONE (OUTPATIENT)
Dept: FAMILY MEDICINE CLINIC | Facility: CLINIC | Age: 41
End: 2021-07-14

## 2021-07-14 NOTE — TELEPHONE ENCOUNTER
Medical Records Request received from Julia Criselda Adocia. for records of the last 3 office visit notes. Above requested records printed and faxed to [phone]. Copy sent to scanning.

## 2021-08-02 ENCOUNTER — IMMUNIZATION (OUTPATIENT)
Dept: LAB | Facility: HOSPITAL | Age: 41
End: 2021-08-02
Attending: EMERGENCY MEDICINE
Payer: COMMERCIAL

## 2021-08-02 DIAGNOSIS — Z23 NEED FOR VACCINATION: Primary | ICD-10-CM

## 2021-08-02 PROCEDURE — 0001A SARSCOV2 VAC 30MCG/0.3ML IM: CPT

## 2021-08-23 ENCOUNTER — IMMUNIZATION (OUTPATIENT)
Dept: LAB | Facility: HOSPITAL | Age: 41
End: 2021-08-23
Attending: EMERGENCY MEDICINE
Payer: COMMERCIAL

## 2021-08-23 DIAGNOSIS — Z23 NEED FOR VACCINATION: Primary | ICD-10-CM

## 2021-08-23 PROCEDURE — 0002A SARSCOV2 VAC 30MCG/0.3ML IM: CPT

## 2021-08-26 ENCOUNTER — OFFICE VISIT (OUTPATIENT)
Dept: FAMILY MEDICINE CLINIC | Facility: CLINIC | Age: 41
End: 2021-08-26
Payer: COMMERCIAL

## 2021-08-26 VITALS
WEIGHT: 209 LBS | HEART RATE: 80 BPM | HEIGHT: 64 IN | DIASTOLIC BLOOD PRESSURE: 67 MMHG | BODY MASS INDEX: 35.68 KG/M2 | OXYGEN SATURATION: 98 % | TEMPERATURE: 99 F | SYSTOLIC BLOOD PRESSURE: 114 MMHG | RESPIRATION RATE: 18 BRPM

## 2021-08-26 DIAGNOSIS — Z11.52 ENCOUNTER FOR SCREENING FOR COVID-19: Primary | ICD-10-CM

## 2021-08-26 PROCEDURE — 3074F SYST BP LT 130 MM HG: CPT | Performed by: PHYSICIAN ASSISTANT

## 2021-08-26 PROCEDURE — 3078F DIAST BP <80 MM HG: CPT | Performed by: PHYSICIAN ASSISTANT

## 2021-08-26 PROCEDURE — 99213 OFFICE O/P EST LOW 20 MIN: CPT | Performed by: PHYSICIAN ASSISTANT

## 2021-08-26 PROCEDURE — 3008F BODY MASS INDEX DOCD: CPT | Performed by: PHYSICIAN ASSISTANT

## 2021-08-26 NOTE — PROGRESS NOTES
CHIEF COMPLAINT:   Patient presents with:  Covid: COVID test - possible exposure - Entered by patient        HPI:   Nas Howe is a 38year old female who presents for covid testing. Son had covid exposure 4 days ago-covid test pending.  Patient received Capsule SR 24 Hr TK 1 C PO D  0   • amphetamine-dextroamphetamine 20 MG Oral Tab Take 20 mg by mouth daily as needed.     0   • ALPRAZOLAM 0.25 MG Oral Tab TAKE 1 TABLET BY MOUTH EVERY DAY AS NEEDED 30 tablet 0      Past Medical History:   Diagnosis Date to the plan. There are no Patient Instructions on file for this visit.

## 2021-08-26 NOTE — PATIENT INSTRUCTIONS
Coronavirus Disease 2019 (COVID-19)     [facility] is committed to the safety and well-being of our patients, members, employees, and communities.  As concerns arise about the new strain of coronavirus that causes COVID-19, [facility] exposure  • After day 7 from date of last exposure with a negative test result (test must occur on day 5 or later)  After stopping quarantine, you should  • Watch for symptoms until 14 days after exposure.   • If you have symptoms, immediately self-isolate Care     If you are awaiting test results or are confirmed positive for COVID -19, and your symptoms worsen at home with symptoms such as: extreme weakness, difficult breathing, or unrelenting fevers greater than 100.4 degrees Fahrenheit, you should contac Follow-up  If you are diagnosed with COVID, refrain from exercise until approved by your primary care provider. Please call your primary care provider within 2 days of your discharge to arrange for a telehealth follow-up.  CDC does not recommend repeat test Control & Prevention (CDC)  10 things you can do to manage your health at home, [place].nl. pdf  [place] [email or web address] Retrieved March 17, 2021, from [email or web address]-information/[email or web address]-19-long-haulers. html  Long-term effects of covid-19. (n.d.).  Retrieved May 11, 2021, from [email or web address]

## 2021-08-27 LAB — SARS-COV-2 RNA RESP QL NAA+PROBE: NOT DETECTED

## 2021-11-05 ENCOUNTER — MED REC SCAN ONLY (OUTPATIENT)
Dept: FAMILY MEDICINE CLINIC | Facility: CLINIC | Age: 41
End: 2021-11-05

## 2021-12-21 ENCOUNTER — OFFICE VISIT (OUTPATIENT)
Dept: FAMILY MEDICINE CLINIC | Facility: CLINIC | Age: 41
End: 2021-12-21
Payer: COMMERCIAL

## 2021-12-21 DIAGNOSIS — Z20.822 ENCOUNTER FOR LABORATORY TESTING FOR COVID-19 VIRUS: ICD-10-CM

## 2021-12-21 DIAGNOSIS — J06.9 VIRAL UPPER RESPIRATORY TRACT INFECTION: Primary | ICD-10-CM

## 2021-12-21 PROCEDURE — 99213 OFFICE O/P EST LOW 20 MIN: CPT | Performed by: NURSE PRACTITIONER

## 2021-12-21 PROCEDURE — 3074F SYST BP LT 130 MM HG: CPT | Performed by: NURSE PRACTITIONER

## 2021-12-21 PROCEDURE — 3078F DIAST BP <80 MM HG: CPT | Performed by: NURSE PRACTITIONER

## 2021-12-21 PROCEDURE — 3008F BODY MASS INDEX DOCD: CPT | Performed by: NURSE PRACTITIONER

## 2021-12-21 RX ORDER — BENZONATATE 200 MG/1
200 CAPSULE ORAL 3 TIMES DAILY PRN
Qty: 30 CAPSULE | Refills: 0 | Status: SHIPPED | OUTPATIENT
Start: 2021-12-21

## 2021-12-21 NOTE — PATIENT INSTRUCTIONS
May continue inhalers that were previously prescribed  Benzonatate for cough  Mucinex for congestion  Rest and fluids    Covid test sent to lab  Quarantine until negative   Follow CDC guidelines    If not improving follow-up with PCP       Viral Upper Resp may be helpful for the following symptoms: cough, sore throat, and nasal and sinus congestion. If you take prescription medicines, ask your healthcare provider or pharmacist which over-the-counter medicines are safe to use.  (Note: Don't use decongestants i

## 2021-12-21 NOTE — PROGRESS NOTES
CHIEF COMPLAINT:   Patient presents with:  Sinus Problem: runny nose, sinus congestion       HPI:   Bean Hummel is a 42year old female who presents for upper respiratory symptoms for  6 days. Patient reports sinus pain.   Associated symptoms include runny Tube 0   • traMADol HCl 50 MG Oral Tab Take 1-2 tablets ( mg total) by mouth every 4 (four) hours as needed for Pain.  12 tablet 0   • FLUoxetine HCl 40 MG Oral Cap TK 1 C PO D  2      Past Medical History:   Diagnosis Date   • DEPRESSION    • Extrins who presents with     ASSESSMENT: Viral upper respiratory tract infection  (primary encounter diagnosis)  Encounter for laboratory testing for covid-19 virus    PLAN:   Covid test sent to lab  Quarantine until negative covid test  Follow CDC guidelines  Ma healthcare provider. · Avoid being exposed to cigarette smoke (yours or others’).   · You may use acetaminophen or ibuprofen to control pain and fever, unless another medicine was prescribed. If you have chronic liver or kidney disease, have ever had a sto medical care. Always follow your healthcare professional's instructions. The patient indicates understanding of these issues and agrees to the plan.

## 2021-12-22 VITALS
BODY MASS INDEX: 35 KG/M2 | HEART RATE: 88 BPM | SYSTOLIC BLOOD PRESSURE: 114 MMHG | TEMPERATURE: 98 F | DIASTOLIC BLOOD PRESSURE: 70 MMHG | WEIGHT: 205 LBS | OXYGEN SATURATION: 98 % | HEIGHT: 64 IN | RESPIRATION RATE: 14 BRPM

## 2022-01-06 ENCOUNTER — TELEMEDICINE (OUTPATIENT)
Dept: FAMILY MEDICINE CLINIC | Facility: CLINIC | Age: 42
End: 2022-01-06
Payer: COMMERCIAL

## 2022-01-06 ENCOUNTER — HOSPITAL ENCOUNTER (OUTPATIENT)
Dept: GENERAL RADIOLOGY | Age: 42
Discharge: HOME | End: 2022-01-06
Attending: FAMILY MEDICINE
Payer: COMMERCIAL

## 2022-01-06 VITALS — OXYGEN SATURATION: 97 %

## 2022-01-06 DIAGNOSIS — U07.1 COVID-19: Primary | ICD-10-CM

## 2022-01-06 DIAGNOSIS — R05.9 COUGH: ICD-10-CM

## 2022-01-06 DIAGNOSIS — U07.1 COVID-19: ICD-10-CM

## 2022-01-06 PROCEDURE — 99214 OFFICE O/P EST MOD 30 MIN: CPT | Performed by: FAMILY MEDICINE

## 2022-01-06 PROCEDURE — 71046 X-RAY EXAM CHEST 2 VIEWS: CPT | Performed by: FAMILY MEDICINE

## 2022-01-06 RX ORDER — FLUOXETINE 10 MG/1
10 CAPSULE ORAL DAILY
COMMUNITY
Start: 2021-09-21

## 2022-01-06 RX ORDER — B-COMPLEX WITH VITAMIN C
TABLET ORAL
COMMUNITY

## 2022-01-06 RX ORDER — BUDESONIDE AND FORMOTEROL FUMARATE DIHYDRATE 160; 4.5 UG/1; UG/1
2 AEROSOL RESPIRATORY (INHALATION) EVERY 12 HOURS
COMMUNITY
Start: 2021-10-28

## 2022-01-06 RX ORDER — LEVOFLOXACIN 500 MG/1
500 TABLET, FILM COATED ORAL DAILY
Qty: 10 TABLET | Refills: 0 | Status: SHIPPED | OUTPATIENT
Start: 2022-01-06 | End: 2022-01-16

## 2022-01-06 RX ORDER — LEVALBUTEROL TARTRATE 45 UG/1
2 AEROSOL, METERED ORAL EVERY 4 HOURS PRN
COMMUNITY
Start: 2021-10-28

## 2022-01-06 NOTE — PROGRESS NOTES
Alma Delia Hartman consents to a virtual check in-service on 1/6/22. Patient understands and accepts the financial responsibility for any deductible, coinsurance, and/or co-pays associated with the service. Jamel Hoskins is a 42year old female.   HPI: Calcium 2 % External Cream, Apply TID x 7 days. (Patient taking differently: 3 (three) times daily as needed.  Apply TID x 7 days.), Disp: 15 g, Rfl: 0  FLUoxetine HCl 40 MG Oral Cap, TK 1 C PO D, Disp: , Rfl: 2  lamoTRIgine 150 MG Oral Tab, TK 2 TS PO D, D heartburn  MUSCULOSKELETAL: denies back pain  ENDO: fatigue  EXTREMITIES:  No pain or numbness    EXAM:   SpO2 97%   Unable to assess vitals during video visit    GENERAL: AAO x 3; pleasant; NAD; well developed; well nourished    ASSESSMENT AND PLAN:   Cov

## 2022-05-10 ENCOUNTER — HOSPITAL ENCOUNTER (OUTPATIENT)
Age: 42
Discharge: HOME | End: 2022-05-10
Payer: COMMERCIAL

## 2022-05-10 ENCOUNTER — TELEPHONE (OUTPATIENT)
Dept: FAMILY MEDICINE CLINIC | Facility: CLINIC | Age: 42
End: 2022-05-10

## 2022-05-10 VITALS
TEMPERATURE: 97 F | DIASTOLIC BLOOD PRESSURE: 75 MMHG | WEIGHT: 210 LBS | OXYGEN SATURATION: 97 % | BODY MASS INDEX: 37.21 KG/M2 | HEIGHT: 63 IN | SYSTOLIC BLOOD PRESSURE: 123 MMHG | RESPIRATION RATE: 18 BRPM | HEART RATE: 94 BPM

## 2022-05-10 DIAGNOSIS — Z20.822 ENCOUNTER FOR LABORATORY TESTING FOR COVID-19 VIRUS: Primary | ICD-10-CM

## 2022-05-10 DIAGNOSIS — R05.9 COUGH: ICD-10-CM

## 2022-05-10 LAB
B-HCG UR QL: NEGATIVE
CLARITY UR: CLEAR
COLOR UR: YELLOW
GLUCOSE UR STRIP-MCNC: NEGATIVE MG/DL
HGB UR QL STRIP: NEGATIVE
KETONES UR STRIP-MCNC: 15 MG/DL
LEUKOCYTE ESTERASE UR QL STRIP: NEGATIVE
NITRITE UR QL STRIP: NEGATIVE
PH UR STRIP: 6 [PH]
PROT UR STRIP-MCNC: NEGATIVE MG/DL
SARS-COV-2 RNA RESP QL NAA+PROBE: NOT DETECTED
SP GR UR STRIP: >=1.03
UROBILINOGEN UR STRIP-ACNC: <2 MG/DL

## 2022-05-10 PROCEDURE — 99213 OFFICE O/P EST LOW 20 MIN: CPT | Performed by: NURSE PRACTITIONER

## 2022-05-10 PROCEDURE — 81002 URINALYSIS NONAUTO W/O SCOPE: CPT | Performed by: NURSE PRACTITIONER

## 2022-05-10 PROCEDURE — 81025 URINE PREGNANCY TEST: CPT | Performed by: NURSE PRACTITIONER

## 2022-05-10 PROCEDURE — U0002 COVID-19 LAB TEST NON-CDC: HCPCS | Performed by: NURSE PRACTITIONER

## 2022-05-10 RX ORDER — BENZONATATE 100 MG/1
100 CAPSULE ORAL 3 TIMES DAILY PRN
Qty: 30 CAPSULE | Refills: 0 | Status: SHIPPED | OUTPATIENT
Start: 2022-05-10 | End: 2022-06-09

## 2022-05-10 RX ORDER — PREDNISONE 20 MG/1
40 TABLET ORAL DAILY
Qty: 10 TABLET | Refills: 0 | Status: SHIPPED | OUTPATIENT
Start: 2022-05-10 | End: 2022-05-15

## 2022-05-10 NOTE — ED INITIAL ASSESSMENT (HPI)
Pt c/o cough, runny nose, SOB, bladder incontinence with coughing, urinary frequency x1 week. Tmax 100. States had covid 12/2021. No dysuria. No hematuria.

## 2022-05-10 NOTE — TELEPHONE ENCOUNTER
If she has had a negative COVID test the past 24-48 hours can see in person otherwise recommend [facility] for in-person eval so clinician can auscultate lungs

## 2022-05-10 NOTE — TELEPHONE ENCOUNTER
Call back from pt. Confirms has NOT had a covid test in the timeframe noted below. Advised of shane's comments/recommendations, explained rationale. Discussed ed[facility] urgent care locations/hours, as pt lives in [facility] [facility]. Provided [facility] address. Patient voices understanding/agrees with plan/no further questions. Pt sts has not had ofc appt in at least 2 years, asks to schedule cpx. Record shows last cpx 10/30/19. Pt requests cpx appt 6/7/22-scheduled. Pt will come fasting.  shane updated, agrees w plan.

## 2022-06-13 ENCOUNTER — LAB ENCOUNTER (OUTPATIENT)
Dept: LAB | Age: 42
End: 2022-06-13
Attending: NURSE PRACTITIONER
Payer: COMMERCIAL

## 2022-06-13 ENCOUNTER — OFFICE VISIT (OUTPATIENT)
Dept: FAMILY MEDICINE CLINIC | Facility: CLINIC | Age: 42
End: 2022-06-13
Payer: COMMERCIAL

## 2022-06-13 ENCOUNTER — TELEPHONE (OUTPATIENT)
Dept: FAMILY MEDICINE CLINIC | Facility: CLINIC | Age: 42
End: 2022-06-13

## 2022-06-13 VITALS
SYSTOLIC BLOOD PRESSURE: 104 MMHG | RESPIRATION RATE: 18 BRPM | BODY MASS INDEX: 38.24 KG/M2 | HEART RATE: 90 BPM | HEIGHT: 64 IN | DIASTOLIC BLOOD PRESSURE: 68 MMHG | WEIGHT: 224 LBS | OXYGEN SATURATION: 97 % | TEMPERATURE: 97 F

## 2022-06-13 DIAGNOSIS — E53.8 LOW SERUM VITAMIN B12: ICD-10-CM

## 2022-06-13 DIAGNOSIS — K30 STOMACH UPSET: ICD-10-CM

## 2022-06-13 DIAGNOSIS — Z00.00 ANNUAL PHYSICAL EXAM: Primary | ICD-10-CM

## 2022-06-13 DIAGNOSIS — E55.9 VITAMIN D DEFICIENCY: ICD-10-CM

## 2022-06-13 DIAGNOSIS — E78.5 DYSLIPIDEMIA: ICD-10-CM

## 2022-06-13 DIAGNOSIS — Z00.00 LABORATORY EXAMINATION ORDERED AS PART OF A ROUTINE GENERAL MEDICAL EXAMINATION: ICD-10-CM

## 2022-06-13 DIAGNOSIS — Z00.00 ANNUAL PHYSICAL EXAM: ICD-10-CM

## 2022-06-13 DIAGNOSIS — R73.01 ELEVATED FASTING GLUCOSE: ICD-10-CM

## 2022-06-13 DIAGNOSIS — R74.8 ELEVATED ALKALINE PHOSPHATASE LEVEL: ICD-10-CM

## 2022-06-13 LAB
ALBUMIN SERPL-MCNC: 4.2 G/DL (ref 3.4–5)
ALBUMIN/GLOB SERPL: 1.3 {RATIO} (ref 1–2)
ALP LIVER SERPL-CCNC: 106 U/L
ALT SERPL-CCNC: 60 U/L
ANION GAP SERPL CALC-SCNC: 8 MMOL/L (ref 0–18)
AST SERPL-CCNC: 39 U/L (ref 15–37)
BASOPHILS # BLD AUTO: 0.06 X10(3) UL (ref 0–0.2)
BASOPHILS NFR BLD AUTO: 0.9 %
BILIRUB SERPL-MCNC: 0.5 MG/DL (ref 0.1–2)
BUN BLD-MCNC: 13 MG/DL (ref 7–18)
CALCIUM BLD-MCNC: 9.7 MG/DL (ref 8.5–10.1)
CHLORIDE SERPL-SCNC: 108 MMOL/L (ref 98–112)
CHOLEST SERPL-MCNC: 227 MG/DL (ref ?–200)
CO2 SERPL-SCNC: 25 MMOL/L (ref 21–32)
CREAT BLD-MCNC: 0.91 MG/DL
EOSINOPHIL # BLD AUTO: 0.18 X10(3) UL (ref 0–0.7)
EOSINOPHIL NFR BLD AUTO: 2.6 %
ERYTHROCYTE [DISTWIDTH] IN BLOOD BY AUTOMATED COUNT: 13 %
FASTING PATIENT LIPID ANSWER: YES
FASTING STATUS PATIENT QL REPORTED: YES
GLOBULIN PLAS-MCNC: 3.3 G/DL (ref 2.8–4.4)
GLUCOSE BLD-MCNC: 102 MG/DL (ref 70–99)
HCT VFR BLD AUTO: 41.3 %
HDLC SERPL-MCNC: 39 MG/DL (ref 40–59)
HGB BLD-MCNC: 13.2 G/DL
IGA SERPL-MCNC: 75.3 MG/DL (ref 70–312)
IMM GRANULOCYTES # BLD AUTO: 0.05 X10(3) UL (ref 0–1)
IMM GRANULOCYTES NFR BLD: 0.7 %
LDLC SERPL CALC-MCNC: 159 MG/DL (ref ?–100)
LYMPHOCYTES # BLD AUTO: 1.47 X10(3) UL (ref 1–4)
LYMPHOCYTES NFR BLD AUTO: 21.3 %
MCH RBC QN AUTO: 30.7 PG (ref 26–34)
MCHC RBC AUTO-ENTMCNC: 32 G/DL (ref 31–37)
MCV RBC AUTO: 96 FL
MONOCYTES # BLD AUTO: 0.5 X10(3) UL (ref 0.1–1)
MONOCYTES NFR BLD AUTO: 7.3 %
NEUTROPHILS # BLD AUTO: 4.63 X10 (3) UL (ref 1.5–7.7)
NEUTROPHILS # BLD AUTO: 4.63 X10(3) UL (ref 1.5–7.7)
NEUTROPHILS NFR BLD AUTO: 67.2 %
NONHDLC SERPL-MCNC: 188 MG/DL (ref ?–130)
OSMOLALITY SERPL CALC.SUM OF ELEC: 292 MOSM/KG (ref 275–295)
PLATELET # BLD AUTO: 341 10(3)UL (ref 150–450)
POTASSIUM SERPL-SCNC: 4.7 MMOL/L (ref 3.5–5.1)
PROT SERPL-MCNC: 7.5 G/DL (ref 6.4–8.2)
RBC # BLD AUTO: 4.3 X10(6)UL
SODIUM SERPL-SCNC: 141 MMOL/L (ref 136–145)
TRIGL SERPL-MCNC: 159 MG/DL (ref 30–149)
TSI SER-ACNC: 1.16 MIU/ML (ref 0.36–3.74)
VIT B12 SERPL-MCNC: 303 PG/ML (ref 193–986)
VIT D+METAB SERPL-MCNC: 12.9 NG/ML (ref 30–100)
VLDLC SERPL CALC-MCNC: 31 MG/DL (ref 0–30)
WBC # BLD AUTO: 6.9 X10(3) UL (ref 4–11)

## 2022-06-13 PROCEDURE — 84443 ASSAY THYROID STIM HORMONE: CPT

## 2022-06-13 PROCEDURE — 85025 COMPLETE CBC W/AUTO DIFF WBC: CPT

## 2022-06-13 PROCEDURE — 86364 TISS TRNSGLTMNASE EA IG CLAS: CPT

## 2022-06-13 PROCEDURE — 80061 LIPID PANEL: CPT

## 2022-06-13 PROCEDURE — 82607 VITAMIN B-12: CPT

## 2022-06-13 PROCEDURE — 83036 HEMOGLOBIN GLYCOSYLATED A1C: CPT

## 2022-06-13 PROCEDURE — 80053 COMPREHEN METABOLIC PANEL: CPT

## 2022-06-13 PROCEDURE — 84075 ASSAY ALKALINE PHOSPHATASE: CPT

## 2022-06-13 PROCEDURE — 84080 ASSAY ALKALINE PHOSPHATASES: CPT

## 2022-06-13 PROCEDURE — 82784 ASSAY IGA/IGD/IGG/IGM EACH: CPT

## 2022-06-13 PROCEDURE — 82306 VITAMIN D 25 HYDROXY: CPT

## 2022-06-13 RX ORDER — FLUOXETINE HYDROCHLORIDE 20 MG/1
20 CAPSULE ORAL DAILY
COMMUNITY
Start: 2022-03-28

## 2022-06-13 NOTE — TELEPHONE ENCOUNTER
Patient signed medical release form for:Cancer treatment [facility] to release records from May 2021 to present to PCP office. Fax medical release form to Cancer treatment [facility] at [phone]. Confirmation received. Release form sent to scanning.

## 2022-06-14 LAB — TTG IGA SER-ACNC: <0.1 U/ML (ref ?–7)

## 2022-06-15 LAB
EST. AVERAGE GLUCOSE BLD GHB EST-MCNC: 111 MG/DL (ref 68–126)
HBA1C MFR BLD: 5.5 % (ref ?–5.7)

## 2022-06-18 LAB
ALK-PHOSPHATASE BONE CALC: 54 U/L
ALK-PHOSPHATASE LIVER CALC: 63 U/L
ALK-PHOSPHATASE OTHER CALC: 0 U/L
ALKALINE PHOSPHATASE: 117 U/L

## 2022-06-30 ENCOUNTER — TELEPHONE (OUTPATIENT)
Dept: FAMILY MEDICINE CLINIC | Facility: CLINIC | Age: 42
End: 2022-06-30

## 2022-07-01 NOTE — TELEPHONE ENCOUNTER
I received patient's medical records from cancer treatment centers [facility] where she had a mammogram completed in January 2022 which showed recommendations of a 6-month follow-up diagnostic mammogram and ultrasound of the right breast  For several nodules in the right breast, at which time patient will be due for screening mammogram of the left breast as well. They also recommended a 2 to 3-month follow-up ultrasound of the right axilla for mildly enlarged right axillary lymph node. Please remind patient to follow-up with cancer center [facility] for these test as this is very important and if she chooses not to please make patient aware that it is [address]

## 2022-07-01 NOTE — TELEPHONE ENCOUNTER
Call to pt-advised of info noted below from Maylin. Reinforced calling cancer treatment centers of [place] location she utilized in January 2022 to schedule these follow up studie, explained rationale/importance/risks of not doing so. Informed if she does not do follow up imaging as recommended, it will be against medical advice. Patient voices understanding/agrees with plan/sts will call contact them as advised. Pt sts \"shane mentioned something about possibly doing a little heart scan but wanted to wait and see how my labs looked first. Can you see it that's something she still wants me to do? \"  Reviewed 6/13/22 OV and lab result notes. Advised will confirm marcello shane and call back w reply. **see above question and advise-thanks!

## 2022-07-01 NOTE — TELEPHONE ENCOUNTER
Call to pt's cell. Reaches identified VM. Per HIPAA consent, left detailed VM informing pt that Gordon still recommends pt completes the [facility] given her cholesterol labs. Provided call back number and ofc phone hours for additional questions, if any.

## 2022-07-05 ENCOUNTER — MED REC SCAN ONLY (OUTPATIENT)
Dept: FAMILY MEDICINE CLINIC | Facility: CLINIC | Age: 42
End: 2022-07-05

## 2022-09-07 ENCOUNTER — APPOINTMENT (OUTPATIENT)
Dept: CT IMAGING | Age: 42
End: 2022-09-07
Attending: EMERGENCY MEDICINE
Payer: COMMERCIAL

## 2022-09-07 ENCOUNTER — HOSPITAL ENCOUNTER (OUTPATIENT)
Age: 42
Discharge: ANOTHER HOSPITAL | End: 2022-09-07
Payer: COMMERCIAL

## 2022-09-07 ENCOUNTER — HOSPITAL ENCOUNTER (EMERGENCY)
Age: 42
Discharge: HOME | End: 2022-09-07
Attending: EMERGENCY MEDICINE
Payer: COMMERCIAL

## 2022-09-07 VITALS
HEART RATE: 81 BPM | HEIGHT: 64 IN | WEIGHT: 220 LBS | DIASTOLIC BLOOD PRESSURE: 69 MMHG | OXYGEN SATURATION: 96 % | RESPIRATION RATE: 16 BRPM | BODY MASS INDEX: 37.56 KG/M2 | SYSTOLIC BLOOD PRESSURE: 127 MMHG | TEMPERATURE: 99 F

## 2022-09-07 VITALS
HEART RATE: 93 BPM | TEMPERATURE: 98 F | OXYGEN SATURATION: 100 % | HEIGHT: 64 IN | BODY MASS INDEX: 37.56 KG/M2 | WEIGHT: 220 LBS | DIASTOLIC BLOOD PRESSURE: 86 MMHG | SYSTOLIC BLOOD PRESSURE: 153 MMHG | RESPIRATION RATE: 20 BRPM

## 2022-09-07 DIAGNOSIS — R10.84 GENERALIZED POSTPRANDIAL ABDOMINAL PAIN: ICD-10-CM

## 2022-09-07 DIAGNOSIS — R10.9 ABDOMINAL PAIN, ACUTE: Primary | ICD-10-CM

## 2022-09-07 DIAGNOSIS — K80.50 BILIARY COLIC: Primary | ICD-10-CM

## 2022-09-07 LAB
ALBUMIN SERPL-MCNC: 4.1 G/DL (ref 3.4–5)
ALBUMIN/GLOB SERPL: 1.2 {RATIO} (ref 1–2)
ALP LIVER SERPL-CCNC: 120 U/L
ALT SERPL-CCNC: 41 U/L
ANION GAP SERPL CALC-SCNC: 6 MMOL/L (ref 0–18)
AST SERPL-CCNC: 23 U/L (ref 15–37)
B-HCG UR QL: NEGATIVE
BASOPHILS # BLD AUTO: 0.06 X10(3) UL (ref 0–0.2)
BASOPHILS NFR BLD AUTO: 0.7 %
BILIRUB SERPL-MCNC: 0.3 MG/DL (ref 0.1–2)
BILIRUB UR QL STRIP.AUTO: NEGATIVE
BUN BLD-MCNC: 16 MG/DL (ref 7–18)
CALCIUM BLD-MCNC: 9.3 MG/DL (ref 8.5–10.1)
CHLORIDE SERPL-SCNC: 104 MMOL/L (ref 98–112)
CO2 SERPL-SCNC: 27 MMOL/L (ref 21–32)
COLOR UR AUTO: YELLOW
CREAT BLD-MCNC: 0.83 MG/DL
EOSINOPHIL # BLD AUTO: 0.25 X10(3) UL (ref 0–0.7)
EOSINOPHIL NFR BLD AUTO: 2.8 %
ERYTHROCYTE [DISTWIDTH] IN BLOOD BY AUTOMATED COUNT: 12.7 %
GFR SERPLBLD BASED ON 1.73 SQ M-ARVRAT: 90 ML/MIN/1.73M2 (ref 60–?)
GLOBULIN PLAS-MCNC: 3.4 G/DL (ref 2.8–4.4)
GLUCOSE BLD-MCNC: 110 MG/DL (ref 70–99)
GLUCOSE UR STRIP.AUTO-MCNC: NEGATIVE MG/DL
HCT VFR BLD AUTO: 40.3 %
HGB BLD-MCNC: 13.3 G/DL
IMM GRANULOCYTES # BLD AUTO: 0.06 X10(3) UL (ref 0–1)
IMM GRANULOCYTES NFR BLD: 0.7 %
LIPASE SERPL-CCNC: 101 U/L (ref 73–393)
LYMPHOCYTES # BLD AUTO: 2.21 X10(3) UL (ref 1–4)
LYMPHOCYTES NFR BLD AUTO: 24.7 %
MCH RBC QN AUTO: 30.6 PG (ref 26–34)
MCHC RBC AUTO-ENTMCNC: 33 G/DL (ref 31–37)
MCV RBC AUTO: 92.6 FL
MONOCYTES # BLD AUTO: 0.76 X10(3) UL (ref 0.1–1)
MONOCYTES NFR BLD AUTO: 8.5 %
NEUTROPHILS # BLD AUTO: 5.6 X10 (3) UL (ref 1.5–7.7)
NEUTROPHILS # BLD AUTO: 5.6 X10(3) UL (ref 1.5–7.7)
NEUTROPHILS NFR BLD AUTO: 62.6 %
NITRITE UR QL STRIP.AUTO: NEGATIVE
OSMOLALITY SERPL CALC.SUM OF ELEC: 286 MOSM/KG (ref 275–295)
PH UR STRIP.AUTO: 6.5 [PH] (ref 5–8)
PLATELET # BLD AUTO: 351 10(3)UL (ref 150–450)
POCT BILIRUBIN URINE: NEGATIVE
POCT BLOOD URINE: NEGATIVE
POCT GLUCOSE URINE: NEGATIVE MG/DL
POCT NITRITE URINE: NEGATIVE
POCT PH URINE: 7 (ref 5–8)
POCT SPECIFIC GRAVITY URINE: 1.02
POCT URINE COLOR: YELLOW
POCT UROBILINOGEN URINE: 1 MG/DL
POTASSIUM SERPL-SCNC: 3.9 MMOL/L (ref 3.5–5.1)
PROT SERPL-MCNC: 7.5 G/DL (ref 6.4–8.2)
RBC # BLD AUTO: 4.35 X10(6)UL
RBC UR QL AUTO: NEGATIVE
SODIUM SERPL-SCNC: 137 MMOL/L (ref 136–145)
SP GR UR STRIP.AUTO: >=1.03 (ref 1–1.03)
UROBILINOGEN UR STRIP.AUTO-MCNC: 1 MG/DL
WBC # BLD AUTO: 8.9 X10(3) UL (ref 4–11)

## 2022-09-07 PROCEDURE — 87086 URINE CULTURE/COLONY COUNT: CPT | Performed by: EMERGENCY MEDICINE

## 2022-09-07 PROCEDURE — 74176 CT ABD & PELVIS W/O CONTRAST: CPT | Performed by: EMERGENCY MEDICINE

## 2022-09-07 PROCEDURE — 99284 EMERGENCY DEPT VISIT MOD MDM: CPT

## 2022-09-07 PROCEDURE — 81025 URINE PREGNANCY TEST: CPT | Performed by: PHYSICIAN ASSISTANT

## 2022-09-07 PROCEDURE — 81001 URINALYSIS AUTO W/SCOPE: CPT | Performed by: EMERGENCY MEDICINE

## 2022-09-07 PROCEDURE — 81002 URINALYSIS NONAUTO W/O SCOPE: CPT | Performed by: PHYSICIAN ASSISTANT

## 2022-09-07 PROCEDURE — 99215 OFFICE O/P EST HI 40 MIN: CPT | Performed by: PHYSICIAN ASSISTANT

## 2022-09-07 PROCEDURE — 85025 COMPLETE CBC W/AUTO DIFF WBC: CPT | Performed by: EMERGENCY MEDICINE

## 2022-09-07 PROCEDURE — 83690 ASSAY OF LIPASE: CPT | Performed by: EMERGENCY MEDICINE

## 2022-09-07 PROCEDURE — 80053 COMPREHEN METABOLIC PANEL: CPT | Performed by: EMERGENCY MEDICINE

## 2022-09-07 PROCEDURE — 81015 MICROSCOPIC EXAM OF URINE: CPT | Performed by: EMERGENCY MEDICINE

## 2022-09-07 PROCEDURE — 96365 THER/PROPH/DIAG IV INF INIT: CPT

## 2022-09-07 RX ORDER — LAMOTRIGINE 150 MG/1
150 TABLET ORAL DAILY
COMMUNITY

## 2022-09-07 RX ORDER — LEVOFLOXACIN 500 MG/1
500 TABLET, FILM COATED ORAL DAILY
Qty: 7 TABLET | Refills: 0 | Status: SHIPPED | OUTPATIENT
Start: 2022-09-07 | End: 2022-09-14

## 2022-09-07 RX ORDER — KETOROLAC TROMETHAMINE 15 MG/ML
15 INJECTION, SOLUTION INTRAMUSCULAR; INTRAVENOUS ONCE
Status: COMPLETED | OUTPATIENT
Start: 2022-09-07 | End: 2022-09-07

## 2022-09-07 RX ORDER — HYDROCODONE BITARTRATE AND ACETAMINOPHEN 5; 325 MG/1; MG/1
1-2 TABLET ORAL EVERY 6 HOURS PRN
Qty: 20 TABLET | Refills: 0 | Status: SHIPPED | OUTPATIENT
Start: 2022-09-07 | End: 2022-09-12

## 2022-09-07 RX ORDER — DICYCLOMINE HCL 20 MG
20 TABLET ORAL 3 TIMES DAILY
Qty: 20 TABLET | Refills: 0 | Status: SHIPPED | OUTPATIENT
Start: 2022-09-07

## 2022-09-07 RX ORDER — DEXTROAMPHETAMINE SACCHARATE, AMPHETAMINE ASPARTATE, DEXTROAMPHETAMINE SULFATE AND AMPHETAMINE SULFATE 5; 5; 5; 5 MG/1; MG/1; MG/1; MG/1
TABLET ORAL DAILY
COMMUNITY

## 2022-09-08 NOTE — ED INITIAL ASSESSMENT (HPI)
Patient states about 2 weeks ago noticed a dull pain to left mid abdominal pain that would resolve in minutes    Patient states 1.5 weeks now- after eating a meal- noted sharp constant pain that becomes constant dull pain afterwards    Abdominal pain radiates around the left mid back  Nauseated with pain- no emesis  Poor appetite    Last BM- soft stool.   Denies any urinary symptoms

## 2022-09-08 NOTE — ED INITIAL ASSESSMENT (HPI)
Pt to ed with c/o sharp LLQ pain with radiation intol left flank after eating, + nausea x 2 weeks   Denies diarrhea or fevers

## 2022-09-10 NOTE — TELEPHONE ENCOUNTER
LOV: 06/13/2022    Last Refill:   triamcinolone 0.1 % External Ointment 15 g 0 6/13/2022     RTC: 06/13/2023    Protocol: n/a

## 2022-09-10 NOTE — TELEPHONE ENCOUNTER
Called pt and LVM to ask what she was using the ointment on.  Informed her to call back on Monday when we open with the details

## 2022-09-14 ENCOUNTER — OFFICE VISIT (OUTPATIENT)
Facility: LOCATION | Age: 42
End: 2022-09-14
Payer: COMMERCIAL

## 2022-09-14 VITALS — HEART RATE: 114 BPM | TEMPERATURE: 97 F

## 2022-09-14 DIAGNOSIS — K80.10 CALCULUS OF GALLBLADDER WITH CHRONIC CHOLECYSTITIS WITHOUT OBSTRUCTION: Primary | ICD-10-CM

## 2022-09-14 DIAGNOSIS — K80.10 CALCULUS OF GALLBLADDER WITH CHOLECYSTITIS WITHOUT BILIARY OBSTRUCTION, UNSPECIFIED CHOLECYSTITIS ACUITY: Primary | ICD-10-CM

## 2022-09-14 PROCEDURE — 99204 OFFICE O/P NEW MOD 45 MIN: CPT | Performed by: SURGERY

## 2022-09-14 RX ORDER — TRIAMCINOLONE ACETONIDE 1 MG/G
OINTMENT TOPICAL
Qty: 15 G | Refills: 0 | OUTPATIENT
Start: 2022-09-14

## 2022-09-19 ENCOUNTER — PATIENT MESSAGE (OUTPATIENT)
Dept: FAMILY MEDICINE CLINIC | Facility: CLINIC | Age: 42
End: 2022-09-19

## 2022-09-19 NOTE — TELEPHONE ENCOUNTER
From: Rachael Alejandre  To: ALFREDO Garcia  Sent: 9/19/2022 10:34 AM CDT  Subject: Scheduled surgery    Good morning Nicky- I wanted to make you aware of a recent ER visit and recommended gallbladder surgery. Everything should be in MyChart if you wish to review. My procedure is scheduled for next Monday, Sept. 26.  Thanks!   Alma Delia

## 2022-09-20 ENCOUNTER — TELEPHONE (OUTPATIENT)
Facility: LOCATION | Age: 42
End: 2022-09-20

## 2022-09-20 RX ORDER — RIBOFLAVIN (VITAMIN B2) 100 MG
100 TABLET ORAL DAILY
COMMUNITY

## 2022-09-20 RX ORDER — MULTIVITAMIN
1 TABLET ORAL DAILY
COMMUNITY

## 2022-09-24 ENCOUNTER — LAB ENCOUNTER (OUTPATIENT)
Dept: LAB | Age: 42
End: 2022-09-24
Attending: SURGERY

## 2022-09-24 DIAGNOSIS — Z01.812 ENCOUNTER FOR PREOPERATIVE SCREENING LABORATORY TESTING FOR COVID-19 VIRUS: ICD-10-CM

## 2022-09-24 DIAGNOSIS — Z20.822 ENCOUNTER FOR PREOPERATIVE SCREENING LABORATORY TESTING FOR COVID-19 VIRUS: ICD-10-CM

## 2022-09-25 LAB — SARS-COV-2 RNA RESP QL NAA+PROBE: NOT DETECTED

## 2022-09-26 ENCOUNTER — ANESTHESIA (OUTPATIENT)
Dept: SURGERY | Facility: HOSPITAL | Age: 42
End: 2022-09-26

## 2022-09-26 ENCOUNTER — ANESTHESIA EVENT (OUTPATIENT)
Dept: SURGERY | Facility: HOSPITAL | Age: 42
End: 2022-09-26

## 2022-09-26 ENCOUNTER — APPOINTMENT (OUTPATIENT)
Dept: GENERAL RADIOLOGY | Facility: HOSPITAL | Age: 42
End: 2022-09-26
Attending: SURGERY

## 2022-09-26 ENCOUNTER — HOSPITAL ENCOUNTER (OUTPATIENT)
Facility: HOSPITAL | Age: 42
Setting detail: HOSPITAL OUTPATIENT SURGERY
Discharge: HOME | End: 2022-09-26
Attending: SURGERY | Admitting: SURGERY

## 2022-09-26 VITALS
RESPIRATION RATE: 18 BRPM | TEMPERATURE: 97 F | SYSTOLIC BLOOD PRESSURE: 134 MMHG | HEIGHT: 64 IN | WEIGHT: 223 LBS | BODY MASS INDEX: 38.07 KG/M2 | HEART RATE: 101 BPM | OXYGEN SATURATION: 92 % | DIASTOLIC BLOOD PRESSURE: 89 MMHG

## 2022-09-26 DIAGNOSIS — Z01.812 ENCOUNTER FOR PREOPERATIVE SCREENING LABORATORY TESTING FOR COVID-19 VIRUS: Primary | ICD-10-CM

## 2022-09-26 DIAGNOSIS — Z20.822 ENCOUNTER FOR PREOPERATIVE SCREENING LABORATORY TESTING FOR COVID-19 VIRUS: Primary | ICD-10-CM

## 2022-09-26 DIAGNOSIS — K80.10 CALCULUS OF GALLBLADDER WITH CHOLECYSTITIS WITHOUT BILIARY OBSTRUCTION, UNSPECIFIED CHOLECYSTITIS ACUITY: ICD-10-CM

## 2022-09-26 LAB — B-HCG UR QL: NEGATIVE

## 2022-09-26 PROCEDURE — 74300 X-RAY BILE DUCTS/PANCREAS: CPT | Performed by: SURGERY

## 2022-09-26 PROCEDURE — 47563 LAPARO CHOLECYSTECTOMY/GRAPH: CPT | Performed by: SURGERY

## 2022-09-26 PROCEDURE — 76942 ECHO GUIDE FOR BIOPSY: CPT | Performed by: ANESTHESIOLOGY

## 2022-09-26 PROCEDURE — 0FT44ZZ RESECTION OF GALLBLADDER, PERCUTANEOUS ENDOSCOPIC APPROACH: ICD-10-PCS | Performed by: SURGERY

## 2022-09-26 PROCEDURE — 47563 LAPARO CHOLECYSTECTOMY/GRAPH: CPT

## 2022-09-26 PROCEDURE — BF100ZZ FLUOROSCOPY OF BILE DUCTS USING HIGH OSMOLAR CONTRAST: ICD-10-PCS | Performed by: SURGERY

## 2022-09-26 RX ORDER — OXYCODONE HYDROCHLORIDE 5 MG/1
5 TABLET ORAL EVERY 6 HOURS PRN
Qty: 15 TABLET | Refills: 0 | Status: SHIPPED | OUTPATIENT
Start: 2022-09-26

## 2022-09-26 RX ORDER — CLINDAMYCIN PHOSPHATE 900 MG/50ML
900 INJECTION INTRAVENOUS ONCE
Status: COMPLETED | OUTPATIENT
Start: 2022-09-26 | End: 2022-09-26

## 2022-09-26 RX ORDER — HYDROMORPHONE HYDROCHLORIDE 1 MG/ML
0.4 INJECTION, SOLUTION INTRAMUSCULAR; INTRAVENOUS; SUBCUTANEOUS EVERY 5 MIN PRN
Status: DISCONTINUED | OUTPATIENT
Start: 2022-09-26 | End: 2022-09-26

## 2022-09-26 RX ORDER — BUPIVACAINE HYDROCHLORIDE 2.5 MG/ML
INJECTION, SOLUTION EPIDURAL; INFILTRATION; INTRACAUDAL AS NEEDED
Status: DISCONTINUED | OUTPATIENT
Start: 2022-09-26 | End: 2022-09-26 | Stop reason: SURG

## 2022-09-26 RX ORDER — ROCURONIUM BROMIDE 10 MG/ML
INJECTION, SOLUTION INTRAVENOUS AS NEEDED
Status: DISCONTINUED | OUTPATIENT
Start: 2022-09-26 | End: 2022-09-26 | Stop reason: SURG

## 2022-09-26 RX ORDER — BUPIVACAINE HYDROCHLORIDE AND EPINEPHRINE 5; 5 MG/ML; UG/ML
INJECTION, SOLUTION EPIDURAL; INTRACAUDAL; PERINEURAL AS NEEDED
Status: DISCONTINUED | OUTPATIENT
Start: 2022-09-26 | End: 2022-09-26 | Stop reason: HOSPADM

## 2022-09-26 RX ORDER — LABETALOL HYDROCHLORIDE 5 MG/ML
5 INJECTION, SOLUTION INTRAVENOUS EVERY 5 MIN PRN
Status: DISCONTINUED | OUTPATIENT
Start: 2022-09-26 | End: 2022-09-26

## 2022-09-26 RX ORDER — ACETAMINOPHEN 500 MG
1000 TABLET ORAL ONCE AS NEEDED
Status: COMPLETED | OUTPATIENT
Start: 2022-09-26 | End: 2022-09-26

## 2022-09-26 RX ORDER — HYDROMORPHONE HYDROCHLORIDE 1 MG/ML
0.2 INJECTION, SOLUTION INTRAMUSCULAR; INTRAVENOUS; SUBCUTANEOUS EVERY 5 MIN PRN
Status: DISCONTINUED | OUTPATIENT
Start: 2022-09-26 | End: 2022-09-26

## 2022-09-26 RX ORDER — MIDAZOLAM HYDROCHLORIDE 1 MG/ML
INJECTION INTRAMUSCULAR; INTRAVENOUS AS NEEDED
Status: DISCONTINUED | OUTPATIENT
Start: 2022-09-26 | End: 2022-09-26 | Stop reason: SURG

## 2022-09-26 RX ORDER — MIDAZOLAM HYDROCHLORIDE 1 MG/ML
1 INJECTION INTRAMUSCULAR; INTRAVENOUS EVERY 5 MIN PRN
Status: DISCONTINUED | OUTPATIENT
Start: 2022-09-26 | End: 2022-09-26

## 2022-09-26 RX ORDER — SCOLOPAMINE TRANSDERMAL SYSTEM 1 MG/1
1 PATCH, EXTENDED RELEASE TRANSDERMAL ONCE
Status: DISCONTINUED | OUTPATIENT
Start: 2022-09-26 | End: 2022-09-26

## 2022-09-26 RX ORDER — SODIUM CHLORIDE, SODIUM LACTATE, POTASSIUM CHLORIDE, CALCIUM CHLORIDE 600; 310; 30; 20 MG/100ML; MG/100ML; MG/100ML; MG/100ML
INJECTION, SOLUTION INTRAVENOUS CONTINUOUS
Status: DISCONTINUED | OUTPATIENT
Start: 2022-09-26 | End: 2022-09-26

## 2022-09-26 RX ORDER — ONDANSETRON 2 MG/ML
INJECTION INTRAMUSCULAR; INTRAVENOUS AS NEEDED
Status: DISCONTINUED | OUTPATIENT
Start: 2022-09-26 | End: 2022-09-26 | Stop reason: SURG

## 2022-09-26 RX ORDER — HYDROCODONE BITARTRATE AND ACETAMINOPHEN 5; 325 MG/1; MG/1
1 TABLET ORAL ONCE AS NEEDED
Status: COMPLETED | OUTPATIENT
Start: 2022-09-26 | End: 2022-09-26

## 2022-09-26 RX ORDER — GLYCOPYRROLATE 0.2 MG/ML
INJECTION, SOLUTION INTRAMUSCULAR; INTRAVENOUS AS NEEDED
Status: DISCONTINUED | OUTPATIENT
Start: 2022-09-26 | End: 2022-09-26 | Stop reason: SURG

## 2022-09-26 RX ORDER — MEPERIDINE HYDROCHLORIDE 25 MG/ML
12.5 INJECTION INTRAMUSCULAR; INTRAVENOUS; SUBCUTANEOUS AS NEEDED
Status: DISCONTINUED | OUTPATIENT
Start: 2022-09-26 | End: 2022-09-26

## 2022-09-26 RX ORDER — KETOROLAC TROMETHAMINE 30 MG/ML
INJECTION, SOLUTION INTRAMUSCULAR; INTRAVENOUS AS NEEDED
Status: DISCONTINUED | OUTPATIENT
Start: 2022-09-26 | End: 2022-09-26 | Stop reason: SURG

## 2022-09-26 RX ORDER — HYDROMORPHONE HYDROCHLORIDE 1 MG/ML
INJECTION, SOLUTION INTRAMUSCULAR; INTRAVENOUS; SUBCUTANEOUS
Status: COMPLETED
Start: 2022-09-26 | End: 2022-09-26

## 2022-09-26 RX ORDER — DEXAMETHASONE SODIUM PHOSPHATE 4 MG/ML
VIAL (ML) INJECTION AS NEEDED
Status: DISCONTINUED | OUTPATIENT
Start: 2022-09-26 | End: 2022-09-26 | Stop reason: SURG

## 2022-09-26 RX ORDER — ACETAMINOPHEN 500 MG
1000 TABLET ORAL ONCE
Status: DISCONTINUED | OUTPATIENT
Start: 2022-09-26 | End: 2022-09-26 | Stop reason: HOSPADM

## 2022-09-26 RX ORDER — HYDROMORPHONE HYDROCHLORIDE 1 MG/ML
0.6 INJECTION, SOLUTION INTRAMUSCULAR; INTRAVENOUS; SUBCUTANEOUS EVERY 5 MIN PRN
Status: DISCONTINUED | OUTPATIENT
Start: 2022-09-26 | End: 2022-09-26

## 2022-09-26 RX ORDER — PROCHLORPERAZINE EDISYLATE 5 MG/ML
5 INJECTION INTRAMUSCULAR; INTRAVENOUS EVERY 8 HOURS PRN
Status: DISCONTINUED | OUTPATIENT
Start: 2022-09-26 | End: 2022-09-26

## 2022-09-26 RX ORDER — ONDANSETRON 2 MG/ML
4 INJECTION INTRAMUSCULAR; INTRAVENOUS EVERY 6 HOURS PRN
Status: DISCONTINUED | OUTPATIENT
Start: 2022-09-26 | End: 2022-09-26

## 2022-09-26 RX ORDER — DEXAMETHASONE SODIUM PHOSPHATE 10 MG/ML
INJECTION, SOLUTION INTRAMUSCULAR; INTRAVENOUS AS NEEDED
Status: DISCONTINUED | OUTPATIENT
Start: 2022-09-26 | End: 2022-09-26 | Stop reason: SURG

## 2022-09-26 RX ORDER — HEPARIN SODIUM 5000 [USP'U]/ML
5000 INJECTION, SOLUTION INTRAVENOUS; SUBCUTANEOUS ONCE
Status: COMPLETED | OUTPATIENT
Start: 2022-09-26 | End: 2022-09-26

## 2022-09-26 RX ORDER — NALOXONE HYDROCHLORIDE 0.4 MG/ML
80 INJECTION, SOLUTION INTRAMUSCULAR; INTRAVENOUS; SUBCUTANEOUS AS NEEDED
Status: DISCONTINUED | OUTPATIENT
Start: 2022-09-26 | End: 2022-09-26

## 2022-09-26 RX ORDER — LIDOCAINE HYDROCHLORIDE 10 MG/ML
INJECTION, SOLUTION EPIDURAL; INFILTRATION; INTRACAUDAL; PERINEURAL AS NEEDED
Status: DISCONTINUED | OUTPATIENT
Start: 2022-09-26 | End: 2022-09-26 | Stop reason: SURG

## 2022-09-26 RX ORDER — NEOSTIGMINE METHYLSULFATE 1 MG/ML
INJECTION, SOLUTION INTRAVENOUS AS NEEDED
Status: DISCONTINUED | OUTPATIENT
Start: 2022-09-26 | End: 2022-09-26 | Stop reason: SURG

## 2022-09-26 RX ORDER — HYDROCODONE BITARTRATE AND ACETAMINOPHEN 5; 325 MG/1; MG/1
2 TABLET ORAL ONCE AS NEEDED
Status: COMPLETED | OUTPATIENT
Start: 2022-09-26 | End: 2022-09-26

## 2022-09-26 RX ADMIN — SODIUM CHLORIDE, SODIUM LACTATE, POTASSIUM CHLORIDE, CALCIUM CHLORIDE: 600; 310; 30; 20 INJECTION, SOLUTION INTRAVENOUS at 13:55:00

## 2022-09-26 RX ADMIN — DEXAMETHASONE SODIUM PHOSPHATE 8 MG: 4 MG/ML VIAL (ML) INJECTION at 12:41:00

## 2022-09-26 RX ADMIN — DEXAMETHASONE SODIUM PHOSPHATE 2 MG: 10 INJECTION, SOLUTION INTRAMUSCULAR; INTRAVENOUS at 12:35:00

## 2022-09-26 RX ADMIN — ONDANSETRON 4 MG: 2 INJECTION INTRAMUSCULAR; INTRAVENOUS at 13:19:00

## 2022-09-26 RX ADMIN — SODIUM CHLORIDE, SODIUM LACTATE, POTASSIUM CHLORIDE, CALCIUM CHLORIDE: 600; 310; 30; 20 INJECTION, SOLUTION INTRAVENOUS at 12:21:00

## 2022-09-26 RX ADMIN — GLYCOPYRROLATE 0.6 MG: 0.2 INJECTION, SOLUTION INTRAMUSCULAR; INTRAVENOUS at 13:34:00

## 2022-09-26 RX ADMIN — CLINDAMYCIN PHOSPHATE 900 MG: 900 INJECTION INTRAVENOUS at 12:48:00

## 2022-09-26 RX ADMIN — KETOROLAC TROMETHAMINE 30 MG: 30 INJECTION, SOLUTION INTRAMUSCULAR; INTRAVENOUS at 13:24:00

## 2022-09-26 RX ADMIN — MIDAZOLAM HYDROCHLORIDE 2 MG: 1 INJECTION INTRAMUSCULAR; INTRAVENOUS at 12:21:00

## 2022-09-26 RX ADMIN — LIDOCAINE HYDROCHLORIDE 30 MG: 10 INJECTION, SOLUTION EPIDURAL; INFILTRATION; INTRACAUDAL; PERINEURAL at 12:26:00

## 2022-09-26 RX ADMIN — BUPIVACAINE HYDROCHLORIDE 30 ML: 2.5 INJECTION, SOLUTION EPIDURAL; INFILTRATION; INTRACAUDAL at 12:32:00

## 2022-09-26 RX ADMIN — BUPIVACAINE HYDROCHLORIDE 30 ML: 2.5 INJECTION, SOLUTION EPIDURAL; INFILTRATION; INTRACAUDAL at 12:35:00

## 2022-09-26 RX ADMIN — DEXAMETHASONE SODIUM PHOSPHATE 2 MG: 10 INJECTION, SOLUTION INTRAMUSCULAR; INTRAVENOUS at 12:32:00

## 2022-09-26 RX ADMIN — ROCURONIUM BROMIDE 40 MG: 10 INJECTION, SOLUTION INTRAVENOUS at 12:26:00

## 2022-09-26 RX ADMIN — NEOSTIGMINE METHYLSULFATE 4 MG: 1 INJECTION, SOLUTION INTRAVENOUS at 13:34:00

## 2022-09-26 NOTE — ANESTHESIA POSTPROCEDURE EVALUATION
Kyra Frazier Patient Status:  Hospital Outpatient Surgery   Age/Gender 36year old female MRN [id number]   [facility] SURGERY Attending Isabel Jefferson MD   Hosp Day # 0 PCP Tammy Lamas MD       Anesthesia Post-op Note    LAPAROSCOPIC CHOLECYSTECTOMY WITH CHOLANGIOGRAM    Procedure Summary     Date: 09/26/22 Room / Location: [facility] OR 09 / 1404 [facility] OR    Anesthesia Start: 1272 Anesthesia Stop: 9023    Procedure: LAPAROSCOPIC CHOLECYSTECTOMY WITH CHOLANGIOGRAM (N/A Abdomen) Diagnosis:       Calculus of gallbladder with cholecystitis without biliary obstruction, unspecified cholecystitis acuity      (Calculus of gallbladder with cholecystitis without biliary obstruction, unspecified cholecystitis acuity [K80.10])    Surgeons: James Way MD Anesthesiologist: Joaquin Collins MD    Anesthesia Type: general ASA Status: 2          Anesthesia Type: general    Vitals Value Taken Time   /87 09/26/22 1358   Temp 97.1 09/26/22 1358   Pulse 95 09/26/22 1358   Resp 16 09/26/22 1358   SpO2 96 09/26/22 1358       Patient Location: PACU    Anesthesia Type: general    Airway Patency: extubated    Postop Pain Control: adequate    Mental Status: mildly sedated but able to meaningfully participate in the post-anesthesia evaluation    Nausea/Vomiting: none    Cardiopulmonary/Hydration status: stable euvolemic    Complications: no apparent anesthesia related complications    Postop vital signs: stable    Dental Exam: Unchanged from Preop

## 2022-09-26 NOTE — ANESTHESIA PROCEDURE NOTES
[facility]  Performed by: Hoa Tomlinson MD  Authorized by: Hoa Tomlinson MD       General Information and Staff    Start Time:  9/26/2022 12:28 PM  End Time:  9/26/2022 12:35 PM  Anesthesiologist:  Hoa Tomlinson MD  Performed by: Anesthesiologist  Patient Location:  OR    Block Placement: Post Induction  Site Identification: real time ultrasound guided and image stored and retrievable    Block site/laterality marked before start: site marked  Reason for Block: at surgeon's request and post-op pain management    Preanesthetic Checklist: 2 patient identifers, IV checked, site marked, risks and benefits discussed, monitors and equipment checked, pre-op evaluation, timeout performed, anesthesia consent, sterile technique used, no prohibitive neurological deficits and no local skin infection at insertion site      Procedure Details    Patient Position:  Supine  Prep: ChloraPrep    Monitoring:  Cardiac monitor, continuous pulse ox and blood pressure cuff  Block Type:  TAP  Laterality:  Bilateral  Injection Technique:  Single-shot    Needle    Needle Type:  Short-bevel and echogenic  Needle Gauge:  21 G  Needle Localization:  Ultrasound guidance  Reason for Ultrasound Use: appropriate spread of the medication was noted in real time and no ultrasound evidence of intravascular and/or intraneural injection            Assessment    Injection Assessment:  Good spread noted, negative resistance, negative aspiration for heme, incremental injection and low pressure  Heart Rate Change: No    - Patient tolerated block procedure well without evidence of immediate block related complications.      Medications      Additional Comments    Medication:  Bupivacaine 0.25% 30mL + Decadron PF 2mf each side

## 2022-09-26 NOTE — ANESTHESIA PROCEDURE NOTES
Airway  Date/Time: 9/26/2022 12:27 PM  Urgency: elective    Airway not difficult    General Information and Staff    Patient location during procedure: OR  Anesthesiologist: Ryan De La Vega MD  Performed: anesthesiologist     Indications and Patient Condition  Indications for airway management: anesthesia  Sedation level: deep  Preoxygenated: yes  Patient position: sniffing  Mask difficulty assessment: 1 - vent by mask    Final Airway Details  Final airway type: endotracheal airway      Successful airway: ETT  Cuffed: yes   Successful intubation technique: direct laryngoscopy  Endotracheal tube insertion site: oral  Blade: Yazan  Blade size: #3  ETT size (mm): 7.5    Cormack-Lehane Classification: grade IIA - partial view of glottis  Placement verified by: chest auscultation and capnometry   Measured from: teeth  ETT to teeth (cm): 20  Number of attempts at approach: 1

## 2022-09-26 NOTE — OPERATIVE REPORT
OPERATIVE REPORT   PREOPERATIVE DIAGNOSIS: Chronic Cholecystitis and cholelithiasis. POSTOPERATIVE DIAGNOSIS: Chronic Cholecystitis and cholelithiasis. PROCEDURE PERFORMED: Laparoscopic cholecystectomy with intraoperative cholangiogram.   ASSISTANT: Chandni Myers. ANESTHESIA: General endotracheal anesthesia. Anesthesiologist.: Shubham Jones MD  BLOOD LOSS: 10 mL. COMPLICATIONS: None apparent. FINDINGS:   1. Severe chronic inflammation of the gallbladder  2. Normal IOC  DISPOSITION: The patient was awakened from anesthesia and brought to the recovery room in stable condition having tolerated the procedure without apparent complication. Needle, sponge, and instrument counts were correct at the end of the procedure. Please note, preprocedure antibiotic and DVT prophylaxis were administered per protocol, and a time-out was performed prior to initiating operation, identifying the correct patient, procedure, and surgeon. INDICATIONS FOR PROCEDURE: Please review the preprocedural history and physical. Briefly, the patient is a 36year old female who now presents for cholecystectomy. The risks, benefits, and alternatives of surgical intervention were explained to the patient in detail including but not limited to bleeding, infection, recurrence, incorrect diagnosis, injury to adjacent organs and structures, and bile duct injury requiring possible immediate or delayed reconstruction potentially by a hepatobiliary surgeon, postoperative bile leak with retained common bile duct stone, the need for post-procedure ERCP as well as the need for further therapeutic diagnostic or surgical intervention. The possibility of conversion to open procedure was also explained to the patient. The patient did voice understanding. Allpertinent questions were answered to the patient's satisfaction after which the patient provided willing and informed consent to proceed with surgery.    PROCEDURE:   NOTE[de-identified] A surgical assistant was absolutely essential to the proper conduct of this case, particularly in the performance of the cholangiogram, as well as the careful dissection necessary in and around the hilum of the gallbladder. DESCRIPTION OF PROCEDURE: The patient was brought to the operating room, and, after the induction of general endotracheal anesthesia, the abdomen was prepped and draped in the usual sterile fashion. The umbilicus was grasped and elevated with an Allis clamp and two perforating towel clips, and an 11-mm incision was made in the superior aspect of the umbilicus through which a Veress needle was placed. A pneumoperitoneum was established in usual manner. Next, an 11-mm trocar and a 10-mm laparoscope were placed into the abdomen. Brief diagnostic survey revealed no other acute pathology. Attention was then turned to the right upper quadrant. The patient was positioned with head up, right side up, and three 5-mm incisions were made in the upper abdomen, one in the subxiphoid position and two in the subcostal region, through which 5-mm trocars were placed into the abdomen. The gallbladder was grasped, elevated, and retracted. Dissection was initiated in the triangle of Calot, with the cystic duct and cystic artery being identified superior to the line of Rouviere, and a critical view was obtained. Next, a clip was placed on the specimen side of the cystic duct, and a cystic ductotomy was created through which a cholangiocatheter was introduced, and a cholangiogram was performed. The cholangiogram reveals brisk and immediate contrast excretion into the duodenum. The cystic duct, common bile duct, hepatic duct, intrahepatic biliary radicals were all free of lesions, stenoses, strictures, filling defects, or other abnormalities.  Representative images were submitted to the radiologist. The cholangiocatheter was removed, and three clips were placed on the patient's side of the cystic duct, which was divided by Endoshears. Next, one clip was placed on the cystic artery on the specimen side, three towards the patient side, and the cystic artery was divided with the Endoshears. Next, the gallbladder was elevated from the gallbladder fossa using electrocautery. Once the gallbladder was elevated from the gallbladder fossa, it was extracted from the umbilical trocar site and sent to Pathology for specimen. Attention was turned to achieve hemostasis on the gallbladder fossa; this was achieved with electrocautery. The right upper quadrant was then copiously irrigated until the effluent fluid was clear. The previously placed clips on the cystic duct and cystic artery were visualized and inspected; they were well approximated, well situated, and there was no evidence of active bleeding or bile leak. At this point, the pneumoperitoneum was released, and the upper trocars were removed under vision. The laparoscope was removed from the umbilicus, as was the trocar. The fascia at the umbilicus was reapproximated using 0 Maxon suture. All skin incisions were cleansed, irrigated, and injected with local anesthetic. Skin incisions were reapproximated using subcuticular 4-0 Vicryl suture. Dermabond was used to seal all the incisions. The patient was awakened from anesthesia and brought to the recovery room in stable condition, having tolerated the procedure without apparent complication. Operative findings were discussed with the patient's family immediately upon conclusion of the procedure.

## 2022-09-26 NOTE — BRIEF OP NOTE
Pre-Operative Diagnosis: Calculus of gallbladder with cholecystitis without biliary obstruction, unspecified cholecystitis acuity [K80.10]     Post-Operative Diagnosis: Calculus of gallbladder with cholecystitis without biliary obstruction, unspecified cholecystitis acuity [K80.10]      Procedure Performed:   LAPAROSCOPIC CHOLECYSTECTOMY WITH CHOLANGIOGRAM    Surgeon(s) and Role:     * Lindsey Butt MD - Primary    Assistant(s):  PA: Laverne Frye PA-C     Surgical Findings: Severe chronic inflammation of gallbladder; gallstone; Normal IOC     Specimen: Gallbladder     Estimated Blood Loss: Blood Output: 10 mL (9/26/2022  1:17 PM)      Dictation Number:      Gomez Hamilton MD  9/26/2022  1:40 PM

## 2022-09-26 NOTE — INTERVAL H&P NOTE
Pre-op Diagnosis: Calculus of gallbladder with cholecystitis without biliary obstruction, unspecified cholecystitis acuity [K80.10]    The above referenced H&P was reviewed by Juan Carols Sanchez MD on 9/26/2022, the patient was examined and no significant changes have occurred in the patient's condition since the H&P was performed. I discussed with the patient and/or legal representative the potential benefits, risks and side effects of this procedure; the likelihood of the patient achieving goals; and potential problems that might occur during recuperation. I discussed reasonable alternatives to the procedure, including risks, benefits and side effects related to the alternatives and risks related to not receiving this procedure. We will proceed with procedure as planned.

## 2022-09-29 ENCOUNTER — TELEPHONE (OUTPATIENT)
Facility: LOCATION | Age: 42
End: 2022-09-29

## 2022-09-29 NOTE — PROGRESS NOTES
Called Alma Delia and spoke to her over the phone regarding her concern of blurry vision in her left eye. Alma Delia recounted that her vision in her left eye is blurry/fuzzy when she tries to focus on an object up close (e.g. looking at her watch). She states that her vision at distance is normal.  She denies headache, visual field defects, or pain around or within her left eye. She has no discomfort suggestive of a corneal abrasion. Per our discussion Alma Delia will follow-up with her PCP and may pursue a comprehensive eye exam. All questions were answered and Alma Delia verbalized her appreciation for the follow-up phone call.

## 2022-09-29 NOTE — TELEPHONE ENCOUNTER
9/26 Lap Juno having blurry eyesite close up in left eye. Anesthesia contacted and they will contact patient. Pt also states umbilical incision hurts more and informed patient that this is normal for this surgery.  PO appt made

## 2022-09-30 ENCOUNTER — TELEPHONE (OUTPATIENT)
Facility: LOCATION | Age: 42
End: 2022-09-30

## 2022-09-30 NOTE — TELEPHONE ENCOUNTER
Pt wants to be sure that post-op symptoms are normal. She's feeling really achy, cold sweats, severe diarrhea since yesterday afternoon. Wants to know if it's expected or if she should take ammodium to stop it or if there's something else going on. Is feeling flu-like symptoms, her throat hurts but she's not sure if it's from the breathing tube. No fever.

## 2022-09-30 NOTE — TELEPHONE ENCOUNTER
Asked pt to try BRAT diet, drink plenty fo fluids, small meals, no fat or dairy. Pt to call with change in symptoms.

## 2022-10-07 ENCOUNTER — MED REC SCAN ONLY (OUTPATIENT)
Dept: FAMILY MEDICINE CLINIC | Facility: CLINIC | Age: 42
End: 2022-10-07

## 2022-10-07 ENCOUNTER — OFFICE VISIT (OUTPATIENT)
Facility: LOCATION | Age: 42
End: 2022-10-07

## 2022-10-07 VITALS — TEMPERATURE: 97 F | HEART RATE: 110 BPM

## 2022-10-07 DIAGNOSIS — Z98.890 POST-OPERATIVE STATE: Primary | ICD-10-CM

## 2022-10-07 DIAGNOSIS — R05.9 COUGH, UNSPECIFIED TYPE: ICD-10-CM

## 2022-10-07 PROCEDURE — 99024 POSTOP FOLLOW-UP VISIT: CPT | Performed by: STUDENT IN AN ORGANIZED HEALTH CARE EDUCATION/TRAINING PROGRAM

## 2022-10-10 ENCOUNTER — PATIENT MESSAGE (OUTPATIENT)
Facility: LOCATION | Age: 42
End: 2022-10-10

## 2022-10-11 ENCOUNTER — PATIENT MESSAGE (OUTPATIENT)
Dept: FAMILY MEDICINE CLINIC | Facility: CLINIC | Age: 42
End: 2022-10-11

## 2022-11-17 ENCOUNTER — OFFICE VISIT (OUTPATIENT)
Dept: FAMILY MEDICINE CLINIC | Facility: CLINIC | Age: 42
End: 2022-11-17
Payer: COMMERCIAL

## 2022-11-17 VITALS
SYSTOLIC BLOOD PRESSURE: 116 MMHG | HEIGHT: 64 IN | BODY MASS INDEX: 38.89 KG/M2 | DIASTOLIC BLOOD PRESSURE: 64 MMHG | HEART RATE: 84 BPM | TEMPERATURE: 98 F | RESPIRATION RATE: 16 BRPM | WEIGHT: 227.81 LBS

## 2022-11-17 DIAGNOSIS — N60.01 CYST OF RIGHT BREAST: ICD-10-CM

## 2022-11-17 DIAGNOSIS — M54.12 CERVICAL RADICULOPATHY: Primary | ICD-10-CM

## 2022-11-17 DIAGNOSIS — R29.898 DECREASED GRIP STRENGTH OF RIGHT HAND: ICD-10-CM

## 2022-11-17 DIAGNOSIS — M62.89 MUSCLE TIGHTNESS: ICD-10-CM

## 2022-11-17 DIAGNOSIS — M25.611 DECREASED RIGHT SHOULDER RANGE OF MOTION: ICD-10-CM

## 2022-11-17 DIAGNOSIS — Z23 NEED FOR VACCINATION: ICD-10-CM

## 2022-11-17 PROCEDURE — 3074F SYST BP LT 130 MM HG: CPT | Performed by: NURSE PRACTITIONER

## 2022-11-17 PROCEDURE — 99214 OFFICE O/P EST MOD 30 MIN: CPT | Performed by: NURSE PRACTITIONER

## 2022-11-17 PROCEDURE — 90471 IMMUNIZATION ADMIN: CPT | Performed by: NURSE PRACTITIONER

## 2022-11-17 PROCEDURE — 3078F DIAST BP <80 MM HG: CPT | Performed by: NURSE PRACTITIONER

## 2022-11-17 PROCEDURE — 3008F BODY MASS INDEX DOCD: CPT | Performed by: NURSE PRACTITIONER

## 2022-11-17 PROCEDURE — 90686 IIV4 VACC NO PRSV 0.5 ML IM: CPT | Performed by: NURSE PRACTITIONER

## 2022-11-17 RX ORDER — PREDNISONE 20 MG/1
TABLET ORAL
Qty: 16 TABLET | Refills: 0 | Status: SHIPPED | OUTPATIENT
Start: 2022-11-17

## 2022-11-17 RX ORDER — CYCLOBENZAPRINE HCL 10 MG
10 TABLET ORAL 3 TIMES DAILY PRN
Qty: 15 TABLET | Refills: 0 | Status: SHIPPED | OUTPATIENT
Start: 2022-11-17

## 2022-11-17 NOTE — PROGRESS NOTES
Patient has egg allergy and received the Flu shot today 11/17/2022. Pt has taken flu shot in the past and has tolerated well.    Pt was kept in room for 15 minutes to monitor for any symptoms  Patient tolerated flu shot well today 11/17/2022  Pt exhibited no noticeable symptoms before release Altagracia

## 2022-11-28 ENCOUNTER — PATIENT MESSAGE (OUTPATIENT)
Dept: FAMILY MEDICINE CLINIC | Facility: CLINIC | Age: 42
End: 2022-11-28

## 2022-11-28 ENCOUNTER — TELEMEDICINE (OUTPATIENT)
Dept: FAMILY MEDICINE CLINIC | Facility: CLINIC | Age: 42
End: 2022-11-28
Payer: COMMERCIAL

## 2022-11-28 DIAGNOSIS — U07.1 COVID-19 VIRUS INFECTION: Primary | ICD-10-CM

## 2022-11-28 NOTE — PROGRESS NOTES
Subjective     HPI:   Jamel Hoskins verbally consents to a Virtual/Telephone Check-In service on 11/28/22. Patient understands and accepts financial responsibility for any deductible, co-insurance and/or co-pays associated with this service. This visit is conducted using Telemedicine with live, interactive video and audio. I returned Jamel Hoskins call by secure video chat, verified date of birth, and discussed their current concerns:   Woke up yesterday with sinus pressure, headaches, fatigued- slept most of the day yesterday. She denies any fever. Does have chills and body aches. She denies any shortness of breath or chest pain. OTC: Mucinex, Ibuprofen  COVID + 11/27/2022. She has a history of COVID-19 12/2021. Would like to discuss Paxlovid. Has not been taking Crestor- ran out of rx. No Further Nursing Notes to Review            REVIEW OF SYSTEMS:  Pertinent items are noted in HPI. Physical Exam:  alert, appears stated age and cooperative, Speaking in full sentences comfortably, Normal work of breathing and mood and affect are normal        Assessment    Diagnoses and all orders for this visit:    COVID-19 virus infection  -     nirmatrelvir&ritonavir 300/100 20 x 150 MG & 10 x 100MG Oral Tablet Therapy Pack; Take two nirmatrelvir tablets (300mg) with one ritonavir tablet (100mg) together twice daily for 5 days. + home COVID-19 test.  Start Paxlovid as ordered. Discussed medication, indication and potential side effects of medication with the patient. Discussed symptomatic treatment with the patient. Push fluids, stay hydrated. Advised pt if new/worsening symptoms including shortness of breath, chest pain to proceed to ER- pt voiced understanding. Pt to call office with update on 12/01/2022. Pt not currently taking Crestor.       Follow up: if new/worsening symptoms  Time of visit: 12 minutes    Please note that the following visit was completed using two-way, real-time interactive audio and video communication. This has been done in good john to provide continuity of care the in the base interest of the provider/patient relationship, due to the ongoing public health crisis/national emergency and because of restrictions of visitation. There are limitations of this visit as a limited physical exam could be performed. Every conscious effort was taken to allow for sufficient and adequate time. Time was spent on reviewing labs, medications, radiology tests and decision making. Appropriate medical decision-making and tests are ordered as detailed in the plan of care above.     Sahnice Ohara, APRN

## 2022-11-29 ENCOUNTER — PATIENT MESSAGE (OUTPATIENT)
Dept: FAMILY MEDICINE CLINIC | Facility: CLINIC | Age: 42
End: 2022-11-29

## 2022-11-29 NOTE — TELEPHONE ENCOUNTER
Pt informed of below and she voiced understanding. She wants to know if she should continue with the Paxlovid?

## 2022-11-29 NOTE — TELEPHONE ENCOUNTER
Often times patient's heart rates with COVID can elevate, could also be related to Paxlovid. Can also see elevated HR with fever/pain/etc. Would have her monitor her heart rate it >120 BPM have her update me OR if new/worsening symptoms.

## 2022-11-29 NOTE — TELEPHONE ENCOUNTER
From: Jennifer Mcnally  To: ALFREDO Blanco  Sent: 11/29/2022 10:09 AM CST  Subject: Paxlovid side effect    Good morning-  Thank you for sending the script over to my pharmacy. I took a dose last night around 9pm. I could not fall asleep at all- my heart rate was higher than usual () even though I was sitting in bed. No chest pain or shortness of breath. Is this a common side effect or should I stop taking it? Thanks!   Alma Delia

## 2022-12-01 ENCOUNTER — TELEPHONE (OUTPATIENT)
Dept: FAMILY MEDICINE CLINIC | Facility: CLINIC | Age: 42
End: 2022-12-01

## 2022-12-01 NOTE — TELEPHONE ENCOUNTER
Noted. Continue symptomatic treatment.  If new/worsening symptoms and/or shortness of breath or chest pain advise IC/ER eval.

## 2022-12-01 NOTE — TELEPHONE ENCOUNTER
Pt called for an U/D-states her cough has decreased, HR still a little better, HR at 90 today, her average is about 73. Continues to have sinus pressure/HAs, fatigue and diarrhea once daily when taking the Paxlovid. Pt denies any CP, SOB, Fever or any other symptoms.

## 2022-12-04 ENCOUNTER — HOSPITAL ENCOUNTER (EMERGENCY)
Facility: HOSPITAL | Age: 42
Discharge: HOME | End: 2022-12-04
Attending: EMERGENCY MEDICINE
Payer: COMMERCIAL

## 2022-12-04 VITALS
BODY MASS INDEX: 37.56 KG/M2 | SYSTOLIC BLOOD PRESSURE: 113 MMHG | OXYGEN SATURATION: 96 % | DIASTOLIC BLOOD PRESSURE: 88 MMHG | HEIGHT: 64 IN | HEART RATE: 76 BPM | RESPIRATION RATE: 17 BRPM | WEIGHT: 220 LBS

## 2022-12-04 DIAGNOSIS — R19.7 DIARRHEA, UNSPECIFIED TYPE: Primary | ICD-10-CM

## 2022-12-04 LAB
ALBUMIN SERPL-MCNC: 3.9 G/DL (ref 3.4–5)
ALBUMIN/GLOB SERPL: 1.3 {RATIO} (ref 1–2)
ALP LIVER SERPL-CCNC: 104 U/L
ALT SERPL-CCNC: 52 U/L
ANION GAP SERPL CALC-SCNC: 6 MMOL/L (ref 0–18)
AST SERPL-CCNC: 33 U/L (ref 15–37)
BASOPHILS # BLD AUTO: 0.05 X10(3) UL (ref 0–0.2)
BASOPHILS NFR BLD AUTO: 0.7 %
BILIRUB SERPL-MCNC: 0.4 MG/DL (ref 0.1–2)
BILIRUB UR QL STRIP.AUTO: NEGATIVE
BUN BLD-MCNC: 17 MG/DL (ref 7–18)
CALCIUM BLD-MCNC: 8.8 MG/DL (ref 8.5–10.1)
CHLORIDE SERPL-SCNC: 109 MMOL/L (ref 98–112)
CLARITY UR REFRACT.AUTO: CLEAR
CO2 SERPL-SCNC: 26 MMOL/L (ref 21–32)
COLOR UR AUTO: YELLOW
CREAT BLD-MCNC: 0.92 MG/DL
EOSINOPHIL # BLD AUTO: 0.18 X10(3) UL (ref 0–0.7)
EOSINOPHIL NFR BLD AUTO: 2.6 %
ERYTHROCYTE [DISTWIDTH] IN BLOOD BY AUTOMATED COUNT: 13.4 %
GFR SERPLBLD BASED ON 1.73 SQ M-ARVRAT: 79 ML/MIN/1.73M2 (ref 60–?)
GLOBULIN PLAS-MCNC: 3.1 G/DL (ref 2.8–4.4)
GLUCOSE BLD-MCNC: 110 MG/DL (ref 70–99)
GLUCOSE UR STRIP.AUTO-MCNC: NEGATIVE MG/DL
HCT VFR BLD AUTO: 36 %
HGB BLD-MCNC: 11.7 G/DL
IMM GRANULOCYTES # BLD AUTO: 0.03 X10(3) UL (ref 0–1)
IMM GRANULOCYTES NFR BLD: 0.4 %
KETONES UR STRIP.AUTO-MCNC: NEGATIVE MG/DL
LYMPHOCYTES # BLD AUTO: 1.94 X10(3) UL (ref 1–4)
LYMPHOCYTES NFR BLD AUTO: 28.1 %
MCH RBC QN AUTO: 30.5 PG (ref 26–34)
MCHC RBC AUTO-ENTMCNC: 32.5 G/DL (ref 31–37)
MCV RBC AUTO: 93.8 FL
MONOCYTES # BLD AUTO: 0.59 X10(3) UL (ref 0.1–1)
MONOCYTES NFR BLD AUTO: 8.5 %
NEUTROPHILS # BLD AUTO: 4.12 X10 (3) UL (ref 1.5–7.7)
NEUTROPHILS # BLD AUTO: 4.12 X10(3) UL (ref 1.5–7.7)
NEUTROPHILS NFR BLD AUTO: 59.7 %
NITRITE UR QL STRIP.AUTO: NEGATIVE
OSMOLALITY SERPL CALC.SUM OF ELEC: 294 MOSM/KG (ref 275–295)
PH UR STRIP.AUTO: 5.5 [PH] (ref 5–8)
PLATELET # BLD AUTO: 269 10(3)UL (ref 150–450)
POTASSIUM SERPL-SCNC: 3.4 MMOL/L (ref 3.5–5.1)
PROT SERPL-MCNC: 7 G/DL (ref 6.4–8.2)
PROT UR STRIP.AUTO-MCNC: NEGATIVE MG/DL
RBC # BLD AUTO: 3.84 X10(6)UL
RBC UR QL AUTO: NEGATIVE
SODIUM SERPL-SCNC: 141 MMOL/L (ref 136–145)
SP GR UR STRIP.AUTO: >=1.03 (ref 1–1.03)
UROBILINOGEN UR STRIP.AUTO-MCNC: 0.2 MG/DL
WBC # BLD AUTO: 6.9 X10(3) UL (ref 4–11)

## 2022-12-04 PROCEDURE — 85025 COMPLETE CBC W/AUTO DIFF WBC: CPT | Performed by: EMERGENCY MEDICINE

## 2022-12-04 PROCEDURE — 80053 COMPREHEN METABOLIC PANEL: CPT | Performed by: EMERGENCY MEDICINE

## 2022-12-04 PROCEDURE — 99284 EMERGENCY DEPT VISIT MOD MDM: CPT

## 2022-12-04 PROCEDURE — 87086 URINE CULTURE/COLONY COUNT: CPT | Performed by: EMERGENCY MEDICINE

## 2022-12-04 PROCEDURE — 81015 MICROSCOPIC EXAM OF URINE: CPT | Performed by: EMERGENCY MEDICINE

## 2022-12-04 PROCEDURE — 96360 HYDRATION IV INFUSION INIT: CPT

## 2022-12-04 PROCEDURE — 81001 URINALYSIS AUTO W/SCOPE: CPT | Performed by: EMERGENCY MEDICINE

## 2022-12-04 RX ORDER — LOPERAMIDE HYDROCHLORIDE 2 MG/1
2 TABLET ORAL 3 TIMES DAILY PRN
Qty: 20 TABLET | Refills: 0 | Status: SHIPPED | OUTPATIENT
Start: 2022-12-04 | End: 2023-01-03

## 2022-12-04 RX ORDER — SODIUM CHLORIDE 9 MG/ML
INJECTION, SOLUTION INTRAVENOUS CONTINUOUS
Status: DISCONTINUED | OUTPATIENT
Start: 2022-12-04 | End: 2022-12-04

## 2023-03-23 ENCOUNTER — MED REC SCAN ONLY (OUTPATIENT)
Dept: FAMILY MEDICINE CLINIC | Facility: CLINIC | Age: 43
End: 2023-03-23

## 2023-07-05 ENCOUNTER — TELEMEDICINE (OUTPATIENT)
Dept: FAMILY MEDICINE CLINIC | Facility: CLINIC | Age: 43
End: 2023-07-05

## 2023-07-05 VITALS — BODY MASS INDEX: 38 KG/M2 | WEIGHT: 220 LBS

## 2023-07-05 DIAGNOSIS — K90.49 FOOD INTOLERANCE: ICD-10-CM

## 2023-07-05 DIAGNOSIS — E55.9 VITAMIN D DEFICIENCY: ICD-10-CM

## 2023-07-05 DIAGNOSIS — Z13.6 SCREENING FOR CARDIOVASCULAR CONDITION: ICD-10-CM

## 2023-07-05 DIAGNOSIS — E53.8 B12 DEFICIENCY: ICD-10-CM

## 2023-07-05 DIAGNOSIS — J45.20 MILD INTERMITTENT REACTIVE AIRWAY DISEASE WITHOUT COMPLICATION: Primary | ICD-10-CM

## 2023-07-05 PROCEDURE — 99214 OFFICE O/P EST MOD 30 MIN: CPT | Performed by: STUDENT IN AN ORGANIZED HEALTH CARE EDUCATION/TRAINING PROGRAM

## 2023-07-05 RX ORDER — ALBUTEROL SULFATE 90 UG/1
AEROSOL, METERED RESPIRATORY (INHALATION) EVERY 6 HOURS PRN
COMMUNITY

## 2023-07-05 RX ORDER — ALBUTEROL SULFATE 90 UG/1
2 AEROSOL, METERED RESPIRATORY (INHALATION) EVERY 6 HOURS PRN
Qty: 18 G | Refills: 0 | Status: SHIPPED | OUTPATIENT
Start: 2023-07-05

## 2023-07-19 ENCOUNTER — LAB ENCOUNTER (OUTPATIENT)
Dept: LAB | Facility: REFERENCE LAB | Age: 43
End: 2023-07-19
Attending: STUDENT IN AN ORGANIZED HEALTH CARE EDUCATION/TRAINING PROGRAM
Payer: COMMERCIAL

## 2023-07-19 DIAGNOSIS — E55.9 VITAMIN D DEFICIENCY: ICD-10-CM

## 2023-07-19 DIAGNOSIS — E56.9 VITAMIN DEFICIENCY, UNSPECIFIED: Primary | ICD-10-CM

## 2023-07-19 DIAGNOSIS — E53.8 B12 DEFICIENCY: ICD-10-CM

## 2023-07-19 DIAGNOSIS — Z13.6 SCREENING FOR CARDIOVASCULAR CONDITION: ICD-10-CM

## 2023-07-19 LAB
ALBUMIN SERPL-MCNC: 3.7 G/DL (ref 3.4–5)
ALBUMIN/GLOB SERPL: 1.1 {RATIO} (ref 1–2)
ALP LIVER SERPL-CCNC: 118 U/L
ALT SERPL-CCNC: 40 U/L
ANION GAP SERPL CALC-SCNC: 7 MMOL/L (ref 0–18)
AST SERPL-CCNC: 27 U/L (ref 15–37)
BASOPHILS # BLD AUTO: 0.05 X10(3) UL (ref 0–0.2)
BASOPHILS NFR BLD AUTO: 0.8 %
BILIRUB SERPL-MCNC: 0.3 MG/DL (ref 0.1–2)
BUN BLD-MCNC: 16 MG/DL (ref 7–18)
BUN/CREAT SERPL: 18.2 (ref 10–20)
CALCIUM BLD-MCNC: 9.8 MG/DL (ref 8.5–10.1)
CHLORIDE SERPL-SCNC: 109 MMOL/L (ref 98–112)
CHOLEST SERPL-MCNC: 245 MG/DL (ref ?–200)
CO2 SERPL-SCNC: 25 MMOL/L (ref 21–32)
CREAT BLD-MCNC: 0.88 MG/DL
DEPRECATED RDW RBC AUTO: 43.7 FL (ref 35.1–46.3)
EOSINOPHIL # BLD AUTO: 0.15 X10(3) UL (ref 0–0.7)
EOSINOPHIL NFR BLD AUTO: 2.4 %
ERYTHROCYTE [DISTWIDTH] IN BLOOD BY AUTOMATED COUNT: 12.7 % (ref 11–15)
FASTING PATIENT LIPID ANSWER: YES
FASTING STATUS PATIENT QL REPORTED: YES
FOLATE SERPL-MCNC: 11.6 NG/ML (ref 8.7–?)
GFR SERPLBLD BASED ON 1.73 SQ M-ARVRAT: 84 ML/MIN/1.73M2 (ref 60–?)
GLOBULIN PLAS-MCNC: 3.5 G/DL (ref 2.8–4.4)
GLUCOSE BLD-MCNC: 99 MG/DL (ref 70–99)
HCT VFR BLD AUTO: 41.6 %
HDLC SERPL-MCNC: 37 MG/DL (ref 40–59)
HGB BLD-MCNC: 13.4 G/DL
IMM GRANULOCYTES # BLD AUTO: 0.05 X10(3) UL (ref 0–1)
IMM GRANULOCYTES NFR BLD: 0.8 %
LDLC SERPL CALC-MCNC: 177 MG/DL (ref ?–100)
LYMPHOCYTES # BLD AUTO: 1.32 X10(3) UL (ref 1–4)
LYMPHOCYTES NFR BLD AUTO: 21 %
MCH RBC QN AUTO: 30.7 PG (ref 26–34)
MCHC RBC AUTO-ENTMCNC: 32.2 G/DL (ref 31–37)
MCV RBC AUTO: 95.2 FL
MONOCYTES # BLD AUTO: 0.42 X10(3) UL (ref 0.1–1)
MONOCYTES NFR BLD AUTO: 6.7 %
NEUTROPHILS # BLD AUTO: 4.31 X10 (3) UL (ref 1.5–7.7)
NEUTROPHILS # BLD AUTO: 4.31 X10(3) UL (ref 1.5–7.7)
NEUTROPHILS NFR BLD AUTO: 68.3 %
NONHDLC SERPL-MCNC: 208 MG/DL (ref ?–130)
OSMOLALITY SERPL CALC.SUM OF ELEC: 293 MOSM/KG (ref 275–295)
PLATELET # BLD AUTO: 318 10(3)UL (ref 150–450)
POTASSIUM SERPL-SCNC: 4.2 MMOL/L (ref 3.5–5.1)
PROT SERPL-MCNC: 7.2 G/DL (ref 6.4–8.2)
RBC # BLD AUTO: 4.37 X10(6)UL
SODIUM SERPL-SCNC: 141 MMOL/L (ref 136–145)
T4 FREE SERPL-MCNC: 0.8 NG/DL (ref 0.8–1.7)
TRIGL SERPL-MCNC: 165 MG/DL (ref 30–149)
TSI SER-ACNC: 1.27 MIU/ML (ref 0.36–3.74)
VIT B12 SERPL-MCNC: 199 PG/ML (ref 193–986)
VIT D+METAB SERPL-MCNC: 11.3 NG/ML (ref 30–100)
VLDLC SERPL CALC-MCNC: 34 MG/DL (ref 0–30)
WBC # BLD AUTO: 6.3 X10(3) UL (ref 4–11)

## 2023-07-19 PROCEDURE — 80053 COMPREHEN METABOLIC PANEL: CPT

## 2023-07-19 PROCEDURE — 82607 VITAMIN B-12: CPT

## 2023-07-19 PROCEDURE — 84439 ASSAY OF FREE THYROXINE: CPT

## 2023-07-19 PROCEDURE — 82746 ASSAY OF FOLIC ACID SERUM: CPT

## 2023-07-19 PROCEDURE — 85025 COMPLETE CBC W/AUTO DIFF WBC: CPT

## 2023-07-19 PROCEDURE — 84443 ASSAY THYROID STIM HORMONE: CPT

## 2023-07-19 PROCEDURE — 80061 LIPID PANEL: CPT

## 2023-07-19 PROCEDURE — 82306 VITAMIN D 25 HYDROXY: CPT

## 2023-07-20 ENCOUNTER — OFFICE VISIT (OUTPATIENT)
Dept: FAMILY MEDICINE CLINIC | Facility: CLINIC | Age: 43
End: 2023-07-20
Payer: COMMERCIAL

## 2023-07-20 VITALS — HEIGHT: 64 IN | WEIGHT: 221 LBS | BODY MASS INDEX: 37.73 KG/M2 | HEART RATE: 106 BPM | OXYGEN SATURATION: 98 %

## 2023-07-20 DIAGNOSIS — K21.9 GASTROESOPHAGEAL REFLUX DISEASE, UNSPECIFIED WHETHER ESOPHAGITIS PRESENT: ICD-10-CM

## 2023-07-20 DIAGNOSIS — E78.00 PURE HYPERCHOLESTEROLEMIA: Primary | ICD-10-CM

## 2023-07-20 DIAGNOSIS — F31.9 BIPOLAR AFFECTIVE DISORDER, REMISSION STATUS UNSPECIFIED (HCC): ICD-10-CM

## 2023-07-20 PROCEDURE — 99214 OFFICE O/P EST MOD 30 MIN: CPT | Performed by: STUDENT IN AN ORGANIZED HEALTH CARE EDUCATION/TRAINING PROGRAM

## 2023-07-20 PROCEDURE — 3008F BODY MASS INDEX DOCD: CPT | Performed by: STUDENT IN AN ORGANIZED HEALTH CARE EDUCATION/TRAINING PROGRAM

## 2023-07-20 RX ORDER — LAMOTRIGINE 100 MG/1
100 TABLET ORAL DAILY
COMMUNITY
Start: 2023-07-17

## 2023-07-20 RX ORDER — ROSUVASTATIN CALCIUM 5 MG/1
5 TABLET, COATED ORAL NIGHTLY
Qty: 90 TABLET | Refills: 3 | Status: SHIPPED | OUTPATIENT
Start: 2023-07-20 | End: 2024-07-19

## 2023-07-21 ENCOUNTER — PATIENT MESSAGE (OUTPATIENT)
Dept: FAMILY MEDICINE CLINIC | Facility: CLINIC | Age: 43
End: 2023-07-21

## 2023-07-21 ENCOUNTER — TELEPHONE (OUTPATIENT)
Dept: FAMILY MEDICINE CLINIC | Facility: CLINIC | Age: 43
End: 2023-07-21

## 2023-07-21 PROBLEM — E78.00 PURE HYPERCHOLESTEROLEMIA: Status: ACTIVE | Noted: 2023-07-21

## 2023-07-21 PROBLEM — F31.9 BIPOLAR DISORDER (HCC): Status: ACTIVE | Noted: 2023-07-21

## 2023-07-21 PROBLEM — K21.9 GASTROESOPHAGEAL REFLUX DISEASE: Status: ACTIVE | Noted: 2023-07-21

## 2023-07-21 RX ORDER — PANTOPRAZOLE SODIUM 40 MG/1
40 FOR SUSPENSION ORAL DAILY
Qty: 30 EACH | Refills: 0 | Status: SHIPPED | OUTPATIENT
Start: 2023-07-21 | End: 2023-07-24

## 2023-07-21 NOTE — TELEPHONE ENCOUNTER
From: Marian Vargas  To: Funmi Hayward MD  Sent: 7/21/2023 11:55 AM CDT  Subject: Update an Rx with Anitra    Good morning-    I spoke with Carolina this morning regarding the Protonix script. They were sent a script for a powder which has an extremely high co-pay and not the usual delivery of this medication. She mentioned she contacted your office to see if it can be filled in tablet form instead. Could you please assist in reaching them today if possible? Thanks in advance for your help.     Alma Delia

## 2023-07-24 RX ORDER — PANTOPRAZOLE SODIUM 40 MG/1
40 TABLET, DELAYED RELEASE ORAL
Qty: 30 TABLET | Refills: 0 | Status: SHIPPED | OUTPATIENT
Start: 2023-07-24

## 2023-09-05 NOTE — TELEPHONE ENCOUNTER
Refill request for:    Requested Prescriptions     Pending Prescriptions Disp Refills    PANTOPRAZOLE 40 MG Oral Tab EC [Pharmacy Med Name: Pantoprazole Sodium Ec 40 Mg Tab Auro] 30 tablet 0     Sig: Take 1 tablet (40 mg total) by mouth every morning before breakfast.        Last Prescribed Quantity Refills   7/24/23 30 0     LOV 7/20/2023     Patient was asked to follow-up in: no follow ups on file. Appointment scheduled: Visit date not found    Medication not on protocol. # 30 with 0 refills routed to Mallika Oliveira MD for review.

## 2023-09-06 RX ORDER — PANTOPRAZOLE SODIUM 40 MG/1
40 TABLET, DELAYED RELEASE ORAL
Qty: 30 TABLET | Refills: 0 | Status: SHIPPED | OUTPATIENT
Start: 2023-09-06

## 2023-09-18 ENCOUNTER — OFFICE VISIT (OUTPATIENT)
Facility: CLINIC | Age: 43
End: 2023-09-18

## 2023-09-18 ENCOUNTER — TELEPHONE (OUTPATIENT)
Facility: CLINIC | Age: 43
End: 2023-09-18

## 2023-09-18 VITALS — SYSTOLIC BLOOD PRESSURE: 130 MMHG | DIASTOLIC BLOOD PRESSURE: 83 MMHG

## 2023-09-18 DIAGNOSIS — R14.0 BLOATING: ICD-10-CM

## 2023-09-18 DIAGNOSIS — R19.7 DIARRHEA, UNSPECIFIED TYPE: Primary | ICD-10-CM

## 2023-09-18 DIAGNOSIS — K21.9 GASTROESOPHAGEAL REFLUX DISEASE, UNSPECIFIED WHETHER ESOPHAGITIS PRESENT: ICD-10-CM

## 2023-09-18 DIAGNOSIS — R10.9 ABDOMINAL CRAMPING: ICD-10-CM

## 2023-09-18 DIAGNOSIS — R13.19 ESOPHAGEAL DYSPHAGIA: ICD-10-CM

## 2023-09-18 DIAGNOSIS — R13.10 ODYNOPHAGIA: ICD-10-CM

## 2023-09-18 DIAGNOSIS — R09.89 GLOBUS SENSATION: ICD-10-CM

## 2023-09-18 RX ORDER — SODIUM, POTASSIUM,MAG SULFATES 17.5-3.13G
SOLUTION, RECONSTITUTED, ORAL ORAL
Qty: 1 EACH | Refills: 0 | Status: SHIPPED | OUTPATIENT
Start: 2023-09-18

## 2023-09-25 ENCOUNTER — TELEPHONE (OUTPATIENT)
Dept: FAMILY MEDICINE CLINIC | Facility: CLINIC | Age: 43
End: 2023-09-25

## 2023-09-25 ENCOUNTER — HOSPITAL ENCOUNTER (OUTPATIENT)
Age: 43
Discharge: HOME | End: 2023-09-25
Payer: COMMERCIAL

## 2023-09-25 ENCOUNTER — APPOINTMENT (OUTPATIENT)
Dept: GENERAL RADIOLOGY | Age: 43
End: 2023-09-25
Attending: NURSE PRACTITIONER
Payer: COMMERCIAL

## 2023-09-25 VITALS
DIASTOLIC BLOOD PRESSURE: 66 MMHG | OXYGEN SATURATION: 99 % | TEMPERATURE: 97 F | HEART RATE: 78 BPM | RESPIRATION RATE: 20 BRPM | SYSTOLIC BLOOD PRESSURE: 130 MMHG

## 2023-09-25 DIAGNOSIS — B34.9 VIRAL SYNDROME: ICD-10-CM

## 2023-09-25 DIAGNOSIS — R06.02 SOB (SHORTNESS OF BREATH): Primary | ICD-10-CM

## 2023-09-25 LAB
S PYO AG THROAT QL: NEGATIVE
SARS-COV-2 RNA RESP QL NAA+PROBE: NOT DETECTED

## 2023-09-25 PROCEDURE — U0002 COVID-19 LAB TEST NON-CDC: HCPCS | Performed by: NURSE PRACTITIONER

## 2023-09-25 PROCEDURE — 71046 X-RAY EXAM CHEST 2 VIEWS: CPT | Performed by: NURSE PRACTITIONER

## 2023-09-25 PROCEDURE — 87880 STREP A ASSAY W/OPTIC: CPT | Performed by: NURSE PRACTITIONER

## 2023-09-25 PROCEDURE — 99214 OFFICE O/P EST MOD 30 MIN: CPT | Performed by: NURSE PRACTITIONER

## 2023-09-25 RX ORDER — FLUTICASONE PROPIONATE 50 MCG
2 SPRAY, SUSPENSION (ML) NASAL DAILY
Qty: 16 G | Refills: 0 | Status: SHIPPED | OUTPATIENT
Start: 2023-09-25

## 2023-09-25 RX ORDER — ALBUTEROL SULFATE 90 UG/1
2 AEROSOL, METERED RESPIRATORY (INHALATION) EVERY 6 HOURS PRN
Qty: 1 EACH | Refills: 0 | Status: SHIPPED | OUTPATIENT
Start: 2023-09-25 | End: 2023-10-25

## 2023-09-25 RX ORDER — BENZONATATE 100 MG/1
100 CAPSULE ORAL 3 TIMES DAILY PRN
Qty: 30 CAPSULE | Refills: 0 | Status: SHIPPED | OUTPATIENT
Start: 2023-09-25

## 2023-09-25 NOTE — DISCHARGE INSTRUCTIONS
An upper respiratory infections are usually the worst days 3-5, but can last up to 14 days. You may develop or continue to cough for up to 3 weeks after. Viral upper respiratory infections do not improve with the use of antibiotics. Options for Self-Care at Home:  Runny Nose/Congestion:  Humidifier at bedside   Vicks vaporub under nose and chest wall  - Nasal Rinse ([place])  - Flonase  - Antihistamine (Allegra, Zyrtec, Claritin)  - Nasal Decongestant (Sudafed); if you have high blood pressure max use 2 days  Cough:  - Tessalon perles three times a day for cough  - Albuterol inhaler 2 puffs every 6 hours for shortness of breath, cough, bronchospasms  - Warm tea w/honey  - Humidifier in bedroom at night  Sore Throat:  - Salt water gargle  - Ibuprofen every 6-8 hours as needed for pain  Fever:  Tylenol or Motrin every 6 hours for fever > 100.4. You can alternate between the two as well if fever high. Follow up with your primary care provider in the next 2-3 days. Go to the emergency room with:  - Fever > 102 that does not respond to medications. - Shortness of breath, difficulty breathing.  - Chest pain.   - Vomiting and unable to keep down fluids or medications

## 2023-09-25 NOTE — ED INITIAL ASSESSMENT (HPI)
1 month of congestion, fever / since resolved and sore throat. States she felt she was improving and now feels worse. + sob and productive cough. Ibuprofen for pain.

## 2023-09-25 NOTE — TELEPHONE ENCOUNTER
Where is the rest of this msg? Per chart it looks like pt may have self scheduled an appt here today with KK--per chart has cancelled and is in immediate care. none

## 2023-09-25 NOTE — TELEPHONE ENCOUNTER
Refill request for:    Requested Prescriptions     Pending Prescriptions Disp Refills    PANTOPRAZOLE 40 MG Oral Tab EC [Pharmacy Med Name: Pantoprazole Sodium Ec 40 Mg Tab Auro] 30 tablet 0     Sig: Take 1 tablet (40 mg total) by mouth every morning before breakfast.        Last Prescribed Quantity Refills   9/06/23 30 0     LOV 7/20/2023     Patient was asked to follow-up in:     Appointment due:     Appointment scheduled: Visit date not found    Medication not on protocol.      Routed to [employer]

## 2023-09-26 RX ORDER — PANTOPRAZOLE SODIUM 40 MG/1
40 TABLET, DELAYED RELEASE ORAL
Qty: 30 TABLET | Refills: 0 | Status: SHIPPED | OUTPATIENT
Start: 2023-09-26

## 2023-09-29 ENCOUNTER — ORDER TRANSCRIPTION (OUTPATIENT)
Dept: ADMINISTRATIVE | Facility: HOSPITAL | Age: 43
End: 2023-09-29

## 2023-09-29 DIAGNOSIS — Z12.31 ENCOUNTER FOR MAMMOGRAM TO ESTABLISH BASELINE MAMMOGRAM: Primary | ICD-10-CM

## 2023-10-14 ENCOUNTER — MED REC SCAN ONLY (OUTPATIENT)
Dept: FAMILY MEDICINE CLINIC | Facility: CLINIC | Age: 43
End: 2023-10-14

## 2023-10-30 ENCOUNTER — MED REC SCAN ONLY (OUTPATIENT)
Dept: FAMILY MEDICINE CLINIC | Facility: CLINIC | Age: 43
End: 2023-10-30

## 2023-10-31 NOTE — TELEPHONE ENCOUNTER
Refill request for:    Requested Prescriptions     Pending Prescriptions Disp Refills    PANTOPRAZOLE 40 MG Oral Tab EC [Pharmacy Med Name: Pantoprazole Sodium Ec 40 Mg Tab Auro] 30 tablet 0     Sig: TAKE ONE TABLET BY MOUTH EVERY MORNING BEFORE BREAKFAST        Last Prescribed Quantity Refills   9/26/23 30 0     LOV 7/20/2023     Patient was asked to follow-up in: no follow ups on file. Appointment scheduled: Visit date not found    Medication not on protocol.      # 30 with 0 refills routed to Juan Brito MD for review

## 2023-11-01 RX ORDER — PANTOPRAZOLE SODIUM 40 MG/1
40 TABLET, DELAYED RELEASE ORAL
Qty: 30 TABLET | Refills: 0 | Status: SHIPPED | OUTPATIENT
Start: 2023-11-01

## 2023-11-02 ENCOUNTER — HOSPITAL ENCOUNTER (OUTPATIENT)
Dept: GENERAL RADIOLOGY | Facility: HOSPITAL | Age: 43
Discharge: HOME | End: 2023-11-02
Attending: INTERNAL MEDICINE
Payer: COMMERCIAL

## 2023-11-02 ENCOUNTER — LAB ENCOUNTER (OUTPATIENT)
Dept: LAB | Facility: HOSPITAL | Age: 43
End: 2023-11-02
Attending: INTERNAL MEDICINE
Payer: COMMERCIAL

## 2023-11-02 ENCOUNTER — HOSPITAL ENCOUNTER (OUTPATIENT)
Dept: ULTRASOUND IMAGING | Facility: HOSPITAL | Age: 43
Discharge: HOME | End: 2023-11-02
Attending: INTERNAL MEDICINE
Payer: COMMERCIAL

## 2023-11-02 DIAGNOSIS — R13.19 ESOPHAGEAL DYSPHAGIA: ICD-10-CM

## 2023-11-02 DIAGNOSIS — R19.7 DIARRHEA, UNSPECIFIED TYPE: ICD-10-CM

## 2023-11-02 DIAGNOSIS — R19.7 DIARRHEA, UNSPECIFIED TYPE: Primary | ICD-10-CM

## 2023-11-02 DIAGNOSIS — R14.0 BLOATING: ICD-10-CM

## 2023-11-02 LAB
C DIFF TOX B STL QL: NEGATIVE
CRYPTOSP AG STL QL IA: NEGATIVE
G LAMBLIA AG STL QL IA: NEGATIVE

## 2023-11-02 PROCEDURE — 87046 STOOL CULTR AEROBIC BACT EA: CPT

## 2023-11-02 PROCEDURE — 74018 RADEX ABDOMEN 1 VIEW: CPT | Performed by: INTERNAL MEDICINE

## 2023-11-02 PROCEDURE — 74246 X-RAY XM UPR GI TRC 2CNTRST: CPT | Performed by: INTERNAL MEDICINE

## 2023-11-02 PROCEDURE — 87329 GIARDIA AG IA: CPT

## 2023-11-02 PROCEDURE — 83993 ASSAY FOR CALPROTECTIN FECAL: CPT

## 2023-11-02 PROCEDURE — 87045 FECES CULTURE AEROBIC BACT: CPT

## 2023-11-02 PROCEDURE — 76830 TRANSVAGINAL US NON-OB: CPT | Performed by: INTERNAL MEDICINE

## 2023-11-02 PROCEDURE — 76856 US EXAM PELVIC COMPLETE: CPT | Performed by: INTERNAL MEDICINE

## 2023-11-02 PROCEDURE — 87427 SHIGA-LIKE TOXIN AG IA: CPT

## 2023-11-02 PROCEDURE — 87272 CRYPTOSPORIDIUM AG IF: CPT

## 2023-11-02 PROCEDURE — 87493 C DIFF AMPLIFIED PROBE: CPT

## 2023-11-03 ENCOUNTER — APPOINTMENT (OUTPATIENT)
Dept: GENERAL RADIOLOGY | Facility: HOSPITAL | Age: 43
End: 2023-11-03
Attending: INTERNAL MEDICINE
Payer: COMMERCIAL

## 2023-11-03 ENCOUNTER — HOSPITAL ENCOUNTER (OUTPATIENT)
Dept: GENERAL RADIOLOGY | Facility: HOSPITAL | Age: 43
Discharge: HOME | End: 2023-11-03
Attending: INTERNAL MEDICINE
Payer: COMMERCIAL

## 2023-11-03 PROCEDURE — 74246 X-RAY XM UPR GI TRC 2CNTRST: CPT | Performed by: INTERNAL MEDICINE

## 2023-11-06 LAB — CALPROTECTIN STL-MCNT: 57.4 ΜG/G (ref ?–50)

## 2023-11-07 ENCOUNTER — OFFICE VISIT (OUTPATIENT)
Dept: GASTROENTEROLOGY | Facility: CLINIC | Age: 43
End: 2023-11-07
Payer: COMMERCIAL

## 2023-11-07 VITALS
BODY MASS INDEX: 38.58 KG/M2 | HEIGHT: 64 IN | WEIGHT: 226 LBS | DIASTOLIC BLOOD PRESSURE: 88 MMHG | SYSTOLIC BLOOD PRESSURE: 139 MMHG | HEART RATE: 95 BPM

## 2023-11-07 DIAGNOSIS — R19.7 DIARRHEA, UNSPECIFIED TYPE: ICD-10-CM

## 2023-11-07 DIAGNOSIS — R05.9 COUGH, UNSPECIFIED TYPE: Primary | ICD-10-CM

## 2023-11-07 DIAGNOSIS — K21.9 GASTROESOPHAGEAL REFLUX DISEASE, UNSPECIFIED WHETHER ESOPHAGITIS PRESENT: ICD-10-CM

## 2023-11-07 PROCEDURE — 3075F SYST BP GE 130 - 139MM HG: CPT | Performed by: INTERNAL MEDICINE

## 2023-11-07 PROCEDURE — 3008F BODY MASS INDEX DOCD: CPT | Performed by: INTERNAL MEDICINE

## 2023-11-07 PROCEDURE — 99214 OFFICE O/P EST MOD 30 MIN: CPT | Performed by: INTERNAL MEDICINE

## 2023-11-07 PROCEDURE — 3079F DIAST BP 80-89 MM HG: CPT | Performed by: INTERNAL MEDICINE

## 2023-11-07 RX ORDER — CHOLESTYRAMINE LIGHT 4 G/5.7G
4 POWDER, FOR SUSPENSION ORAL 3 TIMES DAILY
Qty: 30 PACKET | Refills: 3 | Status: SHIPPED | OUTPATIENT
Start: 2023-11-07

## 2023-11-07 RX ORDER — LOPERAMIDE HYDROCHLORIDE 2 MG/1
2 CAPSULE ORAL 4 TIMES DAILY PRN
Qty: 60 CAPSULE | Refills: 3 | Status: SHIPPED | OUTPATIENT
Start: 2023-11-07

## 2023-11-07 RX ORDER — LAMOTRIGINE 25 MG/1
25 TABLET ORAL 2 TIMES DAILY
COMMUNITY
Start: 2023-10-30

## 2023-11-07 RX ORDER — PANTOPRAZOLE SODIUM 40 MG/1
40 TABLET, DELAYED RELEASE ORAL
Qty: 90 TABLET | Refills: 3 | Status: SHIPPED | OUTPATIENT
Start: 2023-11-07

## 2023-11-07 NOTE — PROGRESS NOTES
[address] [facility]                                                                                                      Clinic Follow-up Visit    Chief Complaint   Patient presents with    Follow - Up     Still having diarrhea, s/p  egd ,cough ,vomiting         Subjective/HPI:   Last visit 09/18/23:   Prudence Randle is a 39year old year-old woman with history of cholecystectomy, asthma, anxiety, depression, and migraines presenting for evaluation of multiple GI complaints. Diarrhea, unspecified type  (primary encounter diagnosis)  Bloating  Esophageal dysphagia  Gastroesophageal reflux disease, unspecified whether esophagitis present  Abdominal cramping  Odynophagia  Globus sensation     Pt with chronic diarrhea that has been present since before lap otf last year. Will r/o infection with stool testing and assess for colonic inflammation with fecal calprotectin. Will obtain KUB to assess for colonic stool burden and r/o overflow diarrhea. Given bloating, will also obtain pelvic US to assess for free fluid, ovarian cysts, fibroids. Will also plan for EGD with small bowel biopsies and colonoscopy to assess for microscopic colitis as she is on PPI, NSAIDs, and SSRI. If all above unrevealing, could consider treatment for IBS-D and/or trial of low-FODMAP diet. She also notes globus sensation where it feels like something is stuck in her throat. No worsening with eating, but does endorse odynophagia where it feels like she's swallowing glass in that area. Will plan for esophagram with timed barium swallow and EGD to assess for diverticulum and/or other structural abnormalities and to r/o ulcers, candida esophagitis and EoE. She should continue PPI daily for now for management of GERD. Today's visit:   She feels like since her last visit symptoms have worsened.   She feels like she cannot eat anything because of coughing. She feels like she is choking on her food. She notes that she cannot sleep because she cannot get comfortable and because of the cough. She notes production of yellow sputum. She felt like she was sick all of September. She did have a recent chest x-ray that was negative for pneumonia. She endorses sick contact of her child. She has been taking 2 Imodium a day with an extra Imodium every couple of hours when she is having diarrhea. She takes Imodium probably 4 to 5 days out of the week. She rarely will have a day where she does not have a bowel movement. She had an unrevealing ultrasound of her pelvis and an x-ray of your abdomen. Her fluoroesophagram was notable for small hiatal hernia with a large amount of gastroesophageal reflux to the upper esophagus. She had a borderline fecal calprotectin at 57.     Wt Readings from Last 6 Encounters:   11/07/23 226 lb (102.5 kg)   07/20/23 221 lb (100.2 kg)   07/05/23 220 lb (99.8 kg)   12/04/22 220 lb (99.8 kg)   11/17/22 227 lb 12.8 oz (103.3 kg)   09/26/22 223 lb (101.2 kg)        History, Medications, Allergies, ROS:      Past Medical History:   Diagnosis Date    Anxiety state     Asthma     DEPRESSION     Depression     Extrinsic asthma, unspecified     GI DISORDER     reflux    Herpes simplex     History of COVID-19 12/2021    1 month loss of taste and smell, cough    Migraines     Visual impairment     contacts      Past Surgical History:   Procedure Laterality Date    OTHER SURGICAL HISTORY  1997    wisdom teeth       Family History   Problem Relation Age of Onset    Diabetes Mother     Dementia Mother     Glaucoma Father     Other (Other) Other         aunt with hypothyroidism    Breast Cancer Other         mid 45s    Cancer Other         breast    Stroke Paternal Grandmother     Glaucoma Paternal Grandmother     Breast Cancer Maternal Aunt         52's    Breast Cancer Maternal Cousin Female 45    Pancreatic Cancer Paternal Grandfather         late 76s    Heart Attack Neg     High Blood Pressure Neg     High Cholesterol Neg       Social History:   Social History     Socioeconomic History    Marital status:    Occupational History    Occupation: [employer]     Employer: [employer]   Tobacco Use    Smoking status: Never    Smokeless tobacco: Never   Vaping Use    Vaping Use: Never used   Substance and Sexual Activity    Alcohol use: Not Currently     Comment: rarely    Drug use: No    Sexual activity: Not Currently     Partners: Male     Birth control/protection: Mirena, I.U.D. Other Topics Concern    Caffeine Concern Yes     Comment: 3-5 per day     Stress Concern Yes    Special Diet Yes     Comment: gluten free, diary free    Exercise No    Seat Belt Yes        Medications (Active prior to today's visit):  Current Outpatient Medications   Medication Sig Dispense Refill    LAMICTAL 25 MG Oral Tab Take 1 tablet (25 mg total) by mouth 2 (two) times daily. cholestyramine light 4 g Oral Powd Pack Take 1 packet (4 g total) by mouth 3 (three) times daily. Dissolved in 2 to 6 ounces of water 30 packet 3    pantoprazole 40 MG Oral Tab EC Take 1 tablet (40 mg total) by mouth 2 (two) times daily before meals. 90 tablet 3    loperamide (IMODIUM A-D) 2 MG Oral Cap Take 1 capsule (2 mg total) by mouth 4 (four) times daily as needed for Diarrhea. 60 capsule 3    fluticasone propionate 50 MCG/ACT Nasal Suspension 2 sprays by Nasal route daily. 16 g 0    rosuvastatin 5 MG Oral Tab Take 1 tablet (5 mg total) by mouth nightly. 90 tablet 3    sertraline 100 MG Oral Tab Take 1 tablet (100 mg total) by mouth daily. albuterol 108 (90 Base) MCG/ACT Inhalation Aero Soln Inhale 2 puffs into the lungs every 6 (six) hours as needed for Wheezing (cough). 18 g 0    Multiple Vitamin (ONE-DAILY MULTI VITAMINS) Oral Tab Take 1 tablet by mouth daily.       cholecalciferol (VITAMIN D3) 125 MCG (5000 UT) Oral Cap Take 1 capsule (5,000 Units total) by mouth daily. Ascorbic Acid (VITAMIN C) 100 MG Oral Tab Take 1 tablet (100 mg total) by mouth daily. sertraline 50 MG Oral Tab Take 1 tablet (50 mg total) by mouth daily. amphetamine-dextroamphetamine 20 MG Oral Tab Take by mouth daily. triamcinolone 0.1 % External Ointment Apply 1 Application topically 2 (two) times daily. 15 g 0    benzonatate 100 MG Oral Cap Take 1 capsule (100 mg total) by mouth 3 (three) times daily as needed for cough. 30 capsule 0    Na Sulfate-K Sulfate-Mg Sulf (SUPREP BOWEL PREP KIT) 17.5-3.13-1.6 GM/177ML Oral Solution Take as discussed in clinic (Patient not taking: Reported on 9/25/2023) 1 each 0    lamoTRIgine 100 MG Oral Tab Take 1 tablet (100 mg total) by mouth daily. albuterol (PROAIR HFA) 108 (90 Base) MCG/ACT Inhalation Aero Soln Inhale into the lungs every 6 (six) hours as needed for Wheezing. ALPRAZOLAM 0.25 MG Oral Tab TAKE 1 TABLET BY MOUTH EVERY DAY AS NEEDED 30 tablet 0       Allergies:   Allergies   Allergen Reactions    Duricef [Cefadroxil Monohydrate] HIVES    Soybean Oil DIARRHEA, HIVES and NAUSEA AND VOMITING    Soybeans DIARRHEA, HIVES and NAUSEA AND VOMITING    Sulfa Antibiotics HIVES    Wellbutrin Xl [Budeprion Xl] HIVES    Zithromax HIVES    Amoxicillin NAUSEA AND VOMITING    Clavulanic Acid NAUSEA AND VOMITING    E-Egg White-Soybean Lecithin DIARRHEA and NAUSEA AND VOMITING    Milk DIARRHEA, FATIGUE and NAUSEA AND VOMITING    Egg Phosphatides-Glycerin-Soybean Oil [Intralipid]     Gluten Flour     Milk Protein-Mometasone [Asmanex]        ROS:   CONSTITUTIONAL:  negative for fevers, chills  EYES:  negative for change in vision  RESPIRATORY:  negative for  shortness of breath  CARDIOVASCULAR:  negative for  chest pain  GASTROINTESTINAL:  see HPI  GENITOURINARY:  negative for dysuria  INTEGUMENT/BREAST:  SKIN:  negative for  rash  ALLERGIC/IMMUNOLOGIC:  negative for hay fever  ENDOCRINE:  negative for cold intolerance and heat intolerance  MUSCULOSKELETAL:  negative for  joint stiffness and joint swelling  BEHAVIOR/PSYCH:  negative for depressed mood    PHYSICAL EXAM:   Blood pressure 139/88, pulse 95, height 5' 4\" (1.626 m), weight 226 lb (102.5 kg), not currently breastfeeding. Gen- Patient appears comfortable and in no acute discomfort  HEENT: the sclera appears anicteric, oropharynx clear, mucus membranes appear moist  CV- regular rate and rhythm, the extremities are warm and well perfused   Lung- Moves air well; No labored breathing  Abdomen- soft, non-tender exam in all quadrants without rigidity or guarding, non-distended, no masses are palpated  Skin- No jaundice  Ext: no edema is evident. Neuro- Alert and oriented x4, and patient is having movement of all 4 extremities   Psych - appropriate, non-agitated    Labs/Imaging:     Patient's labs and imaging were reviewed and discussed with patient today. Fluoroesophagram 11/2023  CONCLUSION:   1. Small sliding hiatal hernia with a large amount of free gastroesophageal reflux to the upper esophagus. Correlate clinically. No ulceration, stricture or mass. 2. Normal stomach, duodenum and duodenal bulb. 3. Normal movement of the barium tablet throughout the esophagus. Pelvic US 11/2023  CONCLUSION:   1. Normal thickness endometrium. IUD in satisfactory position in the endometrial canal.   2. Nabothian cysts within the cervix. Small myometrial cystic lesion measuring 10 x 6 mm of doubtful significance. 3. Normal bilateral ovaries with benign dominant unilocular cysts in both ovaries. KUB 11/2023  CONCLUSION:   Nonspecific nonobstructive bowel gas pattern. ASSESSMENT/PLAN:   Miguel Tracy is a 39year old year-old woman with history of cholecystectomy, asthma, anxiety, depression, and migraines presenting for follow-up. 1. Cough, unspecified type    2. Diarrhea, unspecified type    3.  Gastroesophageal reflux disease, unspecified whether esophagitis present Pt with chronic diarrhea that has been present since before lap otf last year. Infectious stool studies negative. Fecal calprotectin borderline. KUB without stool burden. Pelvic US unrevealing. She is scheduled for EGD and colonoscopy - will plan to obtain biopsies from the small bowel and colon to assess for microscopic colitis as she is on PPI, NSAIDs, and SSRI. If all above unrevealing, could consider treatment for IBS-D and/or trial of low-FODMAP diet. She is currently taking imodium - recommended continuing this wit maximum dose of 16mg daily. Will also try cholestyramine for possible BAD. She also notes chronic cough. Previously was experiencing globus sensation. Now, feels like she has to cough whenever she eats and is choking on her food. Esophagram notable for acid reflux up to upper esophagus and small hiatal hernia. Will plan for EGD to assess for other structural abnormalities and to r/o ulcers, candida esophagitis and EoE. We will increase PPI to BID dosing. Also recommended ENT evaluation for her chronic cough. Recommendations:  Increase the pantoprazole to twice a day dosing. Take the doses 30-60 minutes before breakfast and dinner. Start the cholestyramine - take 1 packet a day and separate from all medications by 3-4 hours. Take imodium as needed - up to 16 mg/day. Schedule an appointment with ENT Lexy Blanco. Orders This Visit:  No orders of the defined types were placed in this encounter. Meds This Visit:  Requested Prescriptions     Signed Prescriptions Disp Refills    cholestyramine light 4 g Oral Powd Pack 30 packet 3     Sig: Take 1 packet (4 g total) by mouth 3 (three) times daily. Dissolved in 2 to 6 ounces of water    pantoprazole 40 MG Oral Tab EC 90 tablet 3     Sig: Take 1 tablet (40 mg total) by mouth 2 (two) times daily before meals.     loperamide (IMODIUM A-D) 2 MG Oral Cap 60 capsule 3     Sig: Take 1 capsule (2 mg total) by mouth 4 (four) times daily as needed for Diarrhea.        Imaging & Referrals:  ENT - INTERNAL     Benji Ceballos MD  [facility], [facility]  11/7/2023

## 2023-11-07 NOTE — PATIENT INSTRUCTIONS
Increase the pantoprazole to twice a day dosing. Take the doses 30-60 minutes before breakfast and dinner. Start the cholestyramine - take 1 packet a day and separate from all medications by 3-4 hours. Take imodium as needed - up to 16 mg/day. Schedule an appointment with ROSEMARY Eaton.

## 2023-11-09 ENCOUNTER — TELEPHONE (OUTPATIENT)
Dept: FAMILY MEDICINE CLINIC | Facility: CLINIC | Age: 43
End: 2023-11-09

## 2023-11-10 ENCOUNTER — TELEPHONE (OUTPATIENT)
Dept: OTOLARYNGOLOGY | Facility: CLINIC | Age: 43
End: 2023-11-10

## 2023-11-10 ENCOUNTER — PATIENT MESSAGE (OUTPATIENT)
Dept: OTOLARYNGOLOGY | Facility: CLINIC | Age: 43
End: 2023-11-10

## 2023-11-10 ENCOUNTER — OFFICE VISIT (OUTPATIENT)
Dept: OTOLARYNGOLOGY | Facility: CLINIC | Age: 43
End: 2023-11-10
Payer: COMMERCIAL

## 2023-11-10 VITALS — BODY MASS INDEX: 37.56 KG/M2 | WEIGHT: 220 LBS | HEIGHT: 64 IN

## 2023-11-10 DIAGNOSIS — R09.82 PND (POST-NASAL DRIP): Primary | ICD-10-CM

## 2023-11-10 DIAGNOSIS — K21.00 GASTROESOPHAGEAL REFLUX DISEASE WITH ESOPHAGITIS, UNSPECIFIED WHETHER HEMORRHAGE: ICD-10-CM

## 2023-11-10 DIAGNOSIS — J30.9 CHRONIC ALLERGIC RHINITIS: ICD-10-CM

## 2023-11-10 DIAGNOSIS — R05.3 CHRONIC COUGH: ICD-10-CM

## 2023-11-10 RX ORDER — AZELASTINE 1 MG/ML
2 SPRAY, METERED NASAL 2 TIMES DAILY
Qty: 30 ML | Refills: 0 | Status: SHIPPED | OUTPATIENT
Start: 2023-11-10

## 2023-11-10 RX ORDER — METHYLPREDNISOLONE 4 MG/1
TABLET ORAL
Qty: 21 TABLET | Refills: 0 | Status: SHIPPED | OUTPATIENT
Start: 2023-11-10

## 2023-11-16 ENCOUNTER — APPOINTMENT (OUTPATIENT)
Dept: GENERAL RADIOLOGY | Age: 43
End: 2023-11-16
Attending: NURSE PRACTITIONER
Payer: COMMERCIAL

## 2023-11-16 ENCOUNTER — HOSPITAL ENCOUNTER (OUTPATIENT)
Age: 43
Discharge: HOME | End: 2023-11-16
Payer: COMMERCIAL

## 2023-11-16 VITALS
SYSTOLIC BLOOD PRESSURE: 144 MMHG | RESPIRATION RATE: 20 BRPM | HEIGHT: 64 IN | HEART RATE: 98 BPM | BODY MASS INDEX: 37.56 KG/M2 | OXYGEN SATURATION: 94 % | TEMPERATURE: 97 F | WEIGHT: 220 LBS | DIASTOLIC BLOOD PRESSURE: 75 MMHG

## 2023-11-16 DIAGNOSIS — J45.901 EXACERBATION OF ASTHMA, UNSPECIFIED ASTHMA SEVERITY, UNSPECIFIED WHETHER PERSISTENT: Primary | ICD-10-CM

## 2023-11-16 DIAGNOSIS — B34.9 VIRAL SYNDROME: ICD-10-CM

## 2023-11-16 DIAGNOSIS — R05.1 ACUTE COUGH: ICD-10-CM

## 2023-11-16 LAB
#MXD IC: 1 X10ˆ3/UL (ref 0.1–1)
B-HCG UR QL: NEGATIVE
BUN BLD-MCNC: 10 MG/DL (ref 7–18)
CHLORIDE BLD-SCNC: 102 MMOL/L (ref 98–112)
CO2 BLD-SCNC: 25 MMOL/L (ref 21–32)
CREAT BLD-MCNC: 0.7 MG/DL
DDIMER WHOLE BLOOD: <200 NG/ML DDU (ref ?–400)
EGFRCR SERPLBLD CKD-EPI 2021: 109 ML/MIN/1.73M2 (ref 60–?)
GLUCOSE BLD-MCNC: 116 MG/DL (ref 70–99)
HCT VFR BLD AUTO: 39 %
HCT VFR BLD CALC: 39 %
HGB BLD-MCNC: 12.3 G/DL
ISTAT IONIZED CALCIUM FOR CHEM 8: 1.16 MMOL/L (ref 1.12–1.32)
LYMPHOCYTES # BLD AUTO: 2 X10ˆ3/UL (ref 1–4)
LYMPHOCYTES NFR BLD AUTO: 25.1 %
MCH RBC QN AUTO: 29.6 PG (ref 26–34)
MCHC RBC AUTO-ENTMCNC: 31.5 G/DL (ref 31–37)
MCV RBC AUTO: 93.8 FL (ref 80–100)
MIXED CELL %: 13.2 %
NEUTROPHILS # BLD AUTO: 4.8 X10ˆ3/UL (ref 1.5–7.7)
NEUTROPHILS NFR BLD AUTO: 61.7 %
PLATELET # BLD AUTO: 298 X10ˆ3/UL (ref 150–450)
POTASSIUM BLD-SCNC: 4.6 MMOL/L (ref 3.6–5.1)
RBC # BLD AUTO: 4.16 X10ˆ6/UL
SARS-COV-2 RNA RESP QL NAA+PROBE: NOT DETECTED
SODIUM BLD-SCNC: 139 MMOL/L (ref 136–145)
TROPONIN I BLD-MCNC: <0.02 NG/ML
WBC # BLD AUTO: 7.8 X10ˆ3/UL (ref 4–11)

## 2023-11-16 PROCEDURE — 71046 X-RAY EXAM CHEST 2 VIEWS: CPT | Performed by: NURSE PRACTITIONER

## 2023-11-16 PROCEDURE — 80047 BASIC METABLC PNL IONIZED CA: CPT

## 2023-11-16 PROCEDURE — 85025 COMPLETE CBC W/AUTO DIFF WBC: CPT | Performed by: NURSE PRACTITIONER

## 2023-11-16 PROCEDURE — 81025 URINE PREGNANCY TEST: CPT

## 2023-11-16 PROCEDURE — 85378 FIBRIN DEGRADE SEMIQUANT: CPT | Performed by: NURSE PRACTITIONER

## 2023-11-16 PROCEDURE — 93005 ELECTROCARDIOGRAM TRACING: CPT

## 2023-11-16 PROCEDURE — 84484 ASSAY OF TROPONIN QUANT: CPT

## 2023-11-16 RX ORDER — ALBUTEROL SULFATE 2.5 MG/3ML
2.5 SOLUTION RESPIRATORY (INHALATION) ONCE
Status: COMPLETED | OUTPATIENT
Start: 2023-11-16 | End: 2023-11-16

## 2023-11-16 RX ORDER — METHYLPREDNISOLONE 4 MG/1
TABLET ORAL
Qty: 1 EACH | Refills: 0 | Status: SHIPPED | OUTPATIENT
Start: 2023-11-16

## 2023-11-16 RX ORDER — IPRATROPIUM BROMIDE AND ALBUTEROL SULFATE 2.5; .5 MG/3ML; MG/3ML
3 SOLUTION RESPIRATORY (INHALATION) ONCE
Status: COMPLETED | OUTPATIENT
Start: 2023-11-16 | End: 2023-11-16

## 2023-11-16 NOTE — ED INITIAL ASSESSMENT (HPI)
C/o SOB for 3 weeks. Pt reports hx of asthma, controlled. Pt reports dry persistent cough, dizziness, nasal congestion. Pt reports it feels like her airway is being squeezed. Pt reports using steroid inhaler. Pt reports dyspnea at rest/exertion.

## 2023-11-16 NOTE — DISCHARGE INSTRUCTIONS
Take Medrol Dosepak as directed. Follow-up with pulmonology as scheduled tomorrow. If difficulty breathing continues go to the emergency room for a continuous nebulizer.

## 2023-11-17 RX ORDER — METHYLPREDNISOLONE 4 MG/1
TABLET ORAL
Qty: 21 TABLET | Refills: 0 | OUTPATIENT
Start: 2023-11-17

## 2023-11-18 LAB
ATRIAL RATE: 90 BPM
P AXIS: 1 DEGREES
P-R INTERVAL: 164 MS
Q-T INTERVAL: 390 MS
QRS DURATION: 92 MS
QTC CALCULATION (BEZET): 477 MS
R AXIS: 83 DEGREES
T AXIS: 29 DEGREES
VENTRICULAR RATE: 90 BPM

## 2023-11-21 ENCOUNTER — TELEPHONE (OUTPATIENT)
Facility: CLINIC | Age: 43
End: 2023-11-21

## 2023-11-21 DIAGNOSIS — R13.10 DYSPHAGIA, UNSPECIFIED TYPE: Primary | ICD-10-CM

## 2023-11-21 DIAGNOSIS — R19.7 DIARRHEA, UNSPECIFIED TYPE: ICD-10-CM

## 2023-11-21 DIAGNOSIS — R14.0 BLOATING: ICD-10-CM

## 2023-11-21 DIAGNOSIS — K21.9 GASTROESOPHAGEAL REFLUX DISEASE, UNSPECIFIED WHETHER ESOPHAGITIS PRESENT: ICD-10-CM

## 2023-11-21 NOTE — TELEPHONE ENCOUNTER
CANCELED for:  Colonoscopy [id number] EGD [id number]  Provider Name:  Dr Lauren Adrian  Date:  12/8/2023  Location: [facility]  Sedation:  MAC  Time:  0900 (pt is aware to arrive at 0800)  Prep:  Colyte  Meds/Allergies Reconciled?:  Physician reviewed  Diagnosis with codes:  Dysphagia R13.10; Diarrhea R19.7;  Bloating R14.0; GERD K21.9

## 2023-11-22 ENCOUNTER — TELEPHONE (OUTPATIENT)
Facility: CLINIC | Age: 43
End: 2023-11-22

## 2023-11-22 NOTE — TELEPHONE ENCOUNTER
1st,overdue reminder letter mailed out to patient   Labs and Imaging order   Date order:    Calprotectin, Fecal

## 2023-11-27 ENCOUNTER — PATIENT MESSAGE (OUTPATIENT)
Dept: GASTROENTEROLOGY | Facility: CLINIC | Age: 43
End: 2023-11-27

## 2023-11-28 NOTE — TELEPHONE ENCOUNTER
From: Terrie Boyer  To: Galilea Burleson  Sent: 11/27/2023 1:11 PM CST  Subject: PreAuthorization Cancellation    Good afternoon-    I am writing today to request that your office contact [facility] to cancel/void a pre-authorization request under [id number] at [phone]. I have decided to not move forward with this procedure at this time. This order is causing another test from a different doctor to be denied, so [facility] is asking for your team to void this one. Thank you!   Alma Delia

## 2023-11-29 NOTE — TELEPHONE ENCOUNTER
Called [facility] at [phone], spoke with SADE ESCOBAR, case handled by Carlos Enrique Orozco, transferred to [place], [place] [id number] has been  already, reference number [id number].

## 2024-05-21 ENCOUNTER — HOSPITAL ENCOUNTER (OUTPATIENT)
Dept: CT IMAGING | Facility: HOSPITAL | Age: 44
Discharge: HOME | End: 2024-05-21
Attending: NURSE PRACTITIONER

## 2024-05-21 ENCOUNTER — LAB ENCOUNTER (OUTPATIENT)
Dept: LAB | Age: 44
End: 2024-05-21
Attending: NURSE PRACTITIONER

## 2024-05-21 ENCOUNTER — PATIENT MESSAGE (OUTPATIENT)
Dept: FAMILY MEDICINE CLINIC | Facility: CLINIC | Age: 44
End: 2024-05-21

## 2024-05-21 ENCOUNTER — OFFICE VISIT (OUTPATIENT)
Dept: FAMILY MEDICINE CLINIC | Facility: CLINIC | Age: 44
End: 2024-05-21

## 2024-05-21 VITALS
RESPIRATION RATE: 16 BRPM | DIASTOLIC BLOOD PRESSURE: 70 MMHG | HEIGHT: 64 IN | TEMPERATURE: 97 F | HEART RATE: 80 BPM | BODY MASS INDEX: 37.73 KG/M2 | SYSTOLIC BLOOD PRESSURE: 114 MMHG | WEIGHT: 221 LBS

## 2024-05-21 DIAGNOSIS — R10.12 LEFT UPPER QUADRANT PAIN: Primary | ICD-10-CM

## 2024-05-21 DIAGNOSIS — R10.12 LEFT UPPER QUADRANT PAIN: ICD-10-CM

## 2024-05-21 DIAGNOSIS — K21.9 GASTROESOPHAGEAL REFLUX DISEASE, UNSPECIFIED WHETHER ESOPHAGITIS PRESENT: ICD-10-CM

## 2024-05-21 LAB
ALBUMIN SERPL-MCNC: 3.8 G/DL (ref 3.4–5)
ALBUMIN/GLOB SERPL: 1.1 {RATIO} (ref 1–2)
ALP LIVER SERPL-CCNC: 100 U/L
ALT SERPL-CCNC: 36 U/L
AMYLASE SERPL-CCNC: 29 U/L (ref 25–115)
ANION GAP SERPL CALC-SCNC: 4 MMOL/L (ref 0–18)
APPEARANCE: CLEAR
AST SERPL-CCNC: 22 U/L (ref 15–37)
BASOPHILS # BLD AUTO: 0.06 X10(3) UL (ref 0–0.2)
BASOPHILS NFR BLD AUTO: 0.9 %
BILIRUB SERPL-MCNC: 0.5 MG/DL (ref 0.1–2)
BILIRUBIN: NEGATIVE
BUN BLD-MCNC: 11 MG/DL (ref 9–23)
CALCIUM BLD-MCNC: 9.1 MG/DL (ref 8.5–10.1)
CHLORIDE SERPL-SCNC: 108 MMOL/L (ref 98–112)
CO2 SERPL-SCNC: 28 MMOL/L (ref 21–32)
CREAT BLD-MCNC: 0.73 MG/DL
EGFRCR SERPLBLD CKD-EPI 2021: 104 ML/MIN/1.73M2 (ref 60–?)
EOSINOPHIL # BLD AUTO: 0.46 X10(3) UL (ref 0–0.7)
EOSINOPHIL NFR BLD AUTO: 6.6 %
ERYTHROCYTE [DISTWIDTH] IN BLOOD BY AUTOMATED COUNT: 13.1 %
FASTING STATUS PATIENT QL REPORTED: NO
GLOBULIN PLAS-MCNC: 3.6 G/DL (ref 2.8–4.4)
GLUCOSE (URINE DIPSTICK): NEGATIVE MG/DL
GLUCOSE BLD-MCNC: 93 MG/DL (ref 70–99)
HCT VFR BLD AUTO: 39.2 %
HGB BLD-MCNC: 12.8 G/DL
IMM GRANULOCYTES # BLD AUTO: 0.06 X10(3) UL (ref 0–1)
IMM GRANULOCYTES NFR BLD: 0.9 %
KETONES (URINE DIPSTICK): NEGATIVE MG/DL
LIPASE SERPL-CCNC: 21 U/L (ref ?–300)
LYMPHOCYTES # BLD AUTO: 1.83 X10(3) UL (ref 1–4)
LYMPHOCYTES NFR BLD AUTO: 26.1 %
MCH RBC QN AUTO: 29.8 PG (ref 26–34)
MCHC RBC AUTO-ENTMCNC: 32.7 G/DL (ref 31–37)
MCV RBC AUTO: 91.4 FL
MONOCYTES # BLD AUTO: 0.55 X10(3) UL (ref 0.1–1)
MONOCYTES NFR BLD AUTO: 7.8 %
MULTISTIX LOT#: ABNORMAL NUMERIC
NEUTROPHILS # BLD AUTO: 4.06 X10 (3) UL (ref 1.5–7.7)
NEUTROPHILS # BLD AUTO: 4.06 X10(3) UL (ref 1.5–7.7)
NEUTROPHILS NFR BLD AUTO: 57.7 %
NITRITE, URINE: NEGATIVE
OCCULT BLOOD: NEGATIVE
OSMOLALITY SERPL CALC.SUM OF ELEC: 289 MOSM/KG (ref 275–295)
PH, URINE: 7.5 (ref 4.5–8)
PLATELET # BLD AUTO: 309 10(3)UL (ref 150–450)
POTASSIUM SERPL-SCNC: 4.5 MMOL/L (ref 3.5–5.1)
PROT SERPL-MCNC: 7.4 G/DL (ref 6.4–8.2)
PROTEIN (URINE DIPSTICK): 30 MG/DL
RBC # BLD AUTO: 4.29 X10(6)UL
SODIUM SERPL-SCNC: 140 MMOL/L (ref 136–145)
SPECIFIC GRAVITY: 1.01 (ref 1–1.03)
URINE-COLOR: YELLOW
UROBILINOGEN,SEMI-QN: 0.2 MG/DL (ref 0–1.9)
WBC # BLD AUTO: 7 X10(3) UL (ref 4–11)

## 2024-05-21 PROCEDURE — 85025 COMPLETE CBC W/AUTO DIFF WBC: CPT

## 2024-05-21 PROCEDURE — 80053 COMPREHEN METABOLIC PANEL: CPT

## 2024-05-21 PROCEDURE — 82150 ASSAY OF AMYLASE: CPT

## 2024-05-21 PROCEDURE — 83690 ASSAY OF LIPASE: CPT

## 2024-05-21 PROCEDURE — 74177 CT ABD & PELVIS W/CONTRAST: CPT | Performed by: NURSE PRACTITIONER

## 2024-05-21 PROCEDURE — 87086 URINE CULTURE/COLONY COUNT: CPT | Performed by: NURSE PRACTITIONER

## 2024-05-21 PROCEDURE — 81003 URINALYSIS AUTO W/O SCOPE: CPT | Performed by: FAMILY MEDICINE

## 2024-05-21 PROCEDURE — 99214 OFFICE O/P EST MOD 30 MIN: CPT | Performed by: FAMILY MEDICINE

## 2024-05-21 RX ORDER — IOHEXOL 350 MG/ML
100 INJECTION, SOLUTION INTRAVENOUS
Status: COMPLETED | OUTPATIENT
Start: 2024-05-21 | End: 2024-05-21

## 2024-05-21 RX ORDER — BUDESONIDE AND FORMOTEROL FUMARATE DIHYDRATE 160; 4.5 UG/1; UG/1
2 AEROSOL RESPIRATORY (INHALATION) 2 TIMES DAILY
COMMUNITY
Start: 2023-11-21

## 2024-05-21 RX ADMIN — IOHEXOL 100 ML: 350 INJECTION, SOLUTION INTRAVENOUS at 18:08:00

## 2024-05-21 NOTE — PROGRESS NOTES
Alma Delia Padgett is a 43 year old female.  HPI:   Patient presents today reporting left upper-mid quadrant pain the last week. Reports symptoms worse over the last 2 days. Describes as a \"sharp\" pain. Rates pain as a \"8-9/10\" at worst. She is taking Pantoprazole 40 mg BID, taking Immodium- has not taken since 05/17/2024. She is seeing GI, has never had endoscopy or colonoscopy. Had bowel movement this morning- hard stool, dark stool. Had 1 shot of alcohol yesterday--but no alcohol prior to this since 05/04/2024.   Has IUD- not having menses, sees Dr. Jacobo.  History of cholecystectomy 2022.   Seeing Dr. Perry--scheduled surgery 07/2024.    Wt Readings from Last 6 Encounters:   05/21/24 221 lb (100.2 kg)   11/16/23 220 lb (99.8 kg)   11/10/23 220 lb (99.8 kg)   11/07/23 226 lb (102.5 kg)   07/20/23 221 lb (100.2 kg)   07/05/23 220 lb (99.8 kg)     Current Outpatient Medications   Medication Sig Dispense Refill    mupirocin 2 % External Ointment Apply intranasally twice a day for 10 days then stop. Use along with OTC bacitracin ointment.      Budesonide-Formoterol Fumarate 160-4.5 MCG/ACT Inhalation Aerosol Inhale 2 puffs into the lungs 2 (two) times daily.      loratadine-pseudoephedrine ER  MG Oral Tablet 24 Hr Take 1 tablet by mouth at bedtime. 30 tablet 1    pantoprazole 40 MG Oral Tab EC Take 1 tablet (40 mg total) by mouth 2 (two) times daily before meals. 90 tablet 3    loperamide (IMODIUM A-D) 2 MG Oral Cap Take 1 capsule (2 mg total) by mouth 4 (four) times daily as needed for Diarrhea. 60 capsule 3    fluticasone propionate 50 MCG/ACT Nasal Suspension 2 sprays by Nasal route daily. 16 g 0    rosuvastatin 5 MG Oral Tab Take 1 tablet (5 mg total) by mouth nightly. 90 tablet 3    sertraline 100 MG Oral Tab Take 1 tablet (100 mg total) by mouth daily.      albuterol (PROAIR HFA) 108 (90 Base) MCG/ACT Inhalation Aero Soln Inhale into the lungs every 6 (six) hours as needed for Wheezing.      Multiple Vitamin  (ONE-DAILY MULTI VITAMINS) Oral Tab Take 1 tablet by mouth daily.      cholecalciferol (VITAMIN D3) 125 MCG (5000 UT) Oral Cap Take 1 capsule (5,000 Units total) by mouth daily.      Ascorbic Acid (VITAMIN C) 100 MG Oral Tab Take 1 tablet (100 mg total) by mouth daily.      amphetamine-dextroamphetamine 20 MG Oral Tab Take by mouth daily.      triamcinolone 0.1 % External Ointment Apply 1 Application topically 2 (two) times daily. 15 g 0      Past Medical History:    Anxiety state    Asthma (HCC)    DEPRESSION    Depression    Extrinsic asthma, unspecified    GI DISORDER    reflux    Herpes simplex    History of COVID-19    1 month loss of taste and smell, cough    Migraines    Visual impairment    contacts      Social History:  Social History     Socioeconomic History    Marital status:    Occupational History    Occupation: [employer]     Employer: [employer]   Tobacco Use    Smoking status: Never    Smokeless tobacco: Never   Vaping Use    Vaping status: Never Used   Substance and Sexual Activity    Alcohol use: Not Currently     Comment: rarely    Drug use: No    Sexual activity: Not Currently     Partners: Male     Birth control/protection: Mirena, I.U.D.   Other Topics Concern    Caffeine Concern Yes     Comment: 3-5 per day     Stress Concern Yes    Special Diet Yes     Comment: gluten free, diary free    Exercise No    Seat Belt Yes     Social Determinants of Health     Physical Activity: High Risk (3/11/2022)    Received from [employer]    Physical Activity     How often do you engage in moderate physical activity for 30 minutes or more?: 1 to 3 days a week     How often do you engage in vigorous physical activity for 20 minutes or more?: 1 to 3 days a week     How many hours per day do you spend sitting?: More than 8 hours    Received from [employer], [employer]    Stress    [facility]        REVIEW OF SYSTEMS:   GENERAL HEALTH: feels well  otherwise  RESPIRATORY: denies shortness of breath with exertion  CARDIOVASCULAR: denies chest pain on exertion  GI: SEE HPI  NEURO: denies headaches  Musculoskeletal: No motor deficits  EXAM:   /70   Pulse 80   Temp 97.1 °F (36.2 °C) (Temporal)   Resp 16   Ht 5' 4\" (1.626 m)   Wt 221 lb (100.2 kg)   BMI 37.93 kg/m²   GENERAL: well developed, well nourished,in no apparent distress  EYES: PERRLA, EOM intact, sclera clear without injection  NECK: supple,no adenopathy  LUNGS: clear to auscultation no rales, rhonchi or wheezes  CARDIO: RRR without murmur  GI: Normal bowel sounds ×4 quadrant, no hepatosplenomegaly or masses. + LUQ tenderness with palpation.   EXTREMITIES: no cyanosis, clubbing or edema  Musculoskeletal: No gross deficit. No CVA tenderness with murphys punch.  Neurological: nerves II through XII grossly intact no sensorimotor deficit  Psychological: Mood and affect are normal. Good communication skills.  ASSESSMENT AND PLAN:     Encounter Diagnoses   Name Primary?    Left upper quadrant pain Yes    Gastroesophageal reflux disease, unspecified whether esophagitis present        Orders Placed This Encounter   Procedures    CBC W Differential W Platelet [E]    Comp Metabolic Panel (14) [E]    Lipase [E]    Amylase    URINALYSIS, AUTO, W/O SCOPE    Urine Culture, Routine [E]       Meds & Refills for this Visit:  Requested Prescriptions      No prescriptions requested or ordered in this encounter       Imaging & Consults:  CT ABDOMEN+PELVIS(CONTRAST ONLY)(CPT=74177)    Follow Up with:  No follow-up provider specified.    1. Left upper quadrant pain  - CBC W Differential W Platelet [E]; Future  - Comp Metabolic Panel (14) [E]; Future  - Lipase [E]; Future  - Amylase; Future  - CT ABDOMEN+PELVIS(CONTRAST ONLY)(CPT=74177); Future  - URINALYSIS, AUTO, W/O SCOPE  - Urine Culture, Routine [E]; Future    2. Gastroesophageal reflux disease, unspecified whether esophagitis present    UA: + protein, trace  leuks  Urine culture sent.  STAT CT ABD/Pelvis- rule out diverticulitis.   Labs as ordered.  Await results for further recommendations.    The patient indicates understanding of these issues and agrees to the plan.  The patient is asked to report to ER if new/worsening symptoms.

## 2024-05-21 NOTE — TELEPHONE ENCOUNTER
From: Alma Delia Padgett  To: Nicky Larson  Sent: 5/21/2024 8:53 AM CDT  Subject: Appointment today 5/21    Good morning Nicky,    On the off chance you have a cancellation this morning- I am available early and at your office. Plenty of work to do- so just very early.    Experiencing pain in my upper abdomen that is getting worse over time. If I didn’t have our appointment scheduled- I would have likely gone to urgent care.    Alma Delia

## 2024-05-22 ENCOUNTER — PATIENT MESSAGE (OUTPATIENT)
Dept: FAMILY MEDICINE CLINIC | Facility: CLINIC | Age: 44
End: 2024-05-22

## 2024-05-22 NOTE — TELEPHONE ENCOUNTER
From: Alma Delia Padgett  To: Nicky Larson  Sent: 5/22/2024 9:46 AM CDT  Subject: CT scan results    Good morning Nicky-    I received a call last night from the on-call doctor that there wasn’t anything noted that warranted an ER trip last night… so that was good.    I’m still in quite a bit of pain- curious if there is a diagnosis or next steps.    Alma Delia

## 2024-05-29 ENCOUNTER — MED REC SCAN ONLY (OUTPATIENT)
Dept: FAMILY MEDICINE CLINIC | Facility: CLINIC | Age: 44
End: 2024-05-29

## 2024-06-05 ENCOUNTER — LAB ENCOUNTER (OUTPATIENT)
Dept: LAB | Age: 44
End: 2024-06-05
Attending: RADIOLOGY
Payer: COMMERCIAL

## 2024-06-05 ENCOUNTER — MED REC SCAN ONLY (OUTPATIENT)
Dept: FAMILY MEDICINE CLINIC | Facility: CLINIC | Age: 44
End: 2024-06-05

## 2024-06-05 DIAGNOSIS — R53.83 OTHER FATIGUE: ICD-10-CM

## 2024-06-05 DIAGNOSIS — K52.9 CHRONIC DIARRHEA: Primary | ICD-10-CM

## 2024-06-05 DIAGNOSIS — R10.9 LEFT SIDED ABDOMINAL PAIN: ICD-10-CM

## 2024-06-05 LAB
CRP SERPL-MCNC: <0.4 MG/DL (ref ?–1)
DEPRECATED HBV CORE AB SER IA-ACNC: 29.9 NG/ML
IRON SATN MFR SERPL: 16 %
IRON SERPL-MCNC: 63 UG/DL
TIBC SERPL-MCNC: 402 UG/DL (ref 250–425)
TRANSFERRIN SERPL-MCNC: 270 MG/DL (ref 250–380)
VIT B12 SERPL-MCNC: 265 PG/ML (ref 211–911)

## 2024-06-05 PROCEDURE — 83993 ASSAY FOR CALPROTECTIN FECAL: CPT

## 2024-06-05 PROCEDURE — 86140 C-REACTIVE PROTEIN: CPT

## 2024-06-05 PROCEDURE — 82728 ASSAY OF FERRITIN: CPT

## 2024-06-05 PROCEDURE — 87493 C DIFF AMPLIFIED PROBE: CPT

## 2024-06-05 PROCEDURE — 83540 ASSAY OF IRON: CPT

## 2024-06-05 PROCEDURE — 36415 COLL VENOUS BLD VENIPUNCTURE: CPT

## 2024-06-05 PROCEDURE — 82607 VITAMIN B-12: CPT

## 2024-06-05 PROCEDURE — 84466 ASSAY OF TRANSFERRIN: CPT

## 2024-06-05 PROCEDURE — 87046 STOOL CULTR AEROBIC BACT EA: CPT

## 2024-06-06 LAB
C DIFF TOX B STL QL: NEGATIVE
CALPROTECTIN STL-MCNT: 31.3 ΜG/G (ref ?–50)

## 2024-06-10 PROBLEM — G47.33 OSA (OBSTRUCTIVE SLEEP APNEA): Status: ACTIVE | Noted: 2024-06-10

## 2024-06-11 ENCOUNTER — MED REC SCAN ONLY (OUTPATIENT)
Dept: FAMILY MEDICINE CLINIC | Facility: CLINIC | Age: 44
End: 2024-06-11

## 2024-06-14 ENCOUNTER — TELEPHONE (OUTPATIENT)
Dept: FAMILY MEDICINE CLINIC | Facility: CLINIC | Age: 44
End: 2024-06-14

## 2024-06-14 NOTE — TELEPHONE ENCOUNTER
Received fax from [facility] regarding patients telehealth visit regarding sleep apnea. Nicky would like to know if CPAP titration was ordered.     Called [facility] and they confirmed that titration was ordered on 06/07/2024 and sent to [employer]. Was told that patient may be waiting for insurance approval.

## 2024-06-27 ENCOUNTER — OFFICE VISIT (OUTPATIENT)
Dept: FAMILY MEDICINE CLINIC | Facility: CLINIC | Age: 44
End: 2024-06-27

## 2024-06-27 ENCOUNTER — TELEPHONE (OUTPATIENT)
Dept: FAMILY MEDICINE CLINIC | Facility: CLINIC | Age: 44
End: 2024-06-27

## 2024-06-27 ENCOUNTER — PATIENT MESSAGE (OUTPATIENT)
Dept: FAMILY MEDICINE CLINIC | Facility: CLINIC | Age: 44
End: 2024-06-27

## 2024-06-27 ENCOUNTER — LAB ENCOUNTER (OUTPATIENT)
Dept: LAB | Age: 44
End: 2024-06-27
Attending: NURSE PRACTITIONER
Payer: COMMERCIAL

## 2024-06-27 VITALS
WEIGHT: 214.63 LBS | RESPIRATION RATE: 16 BRPM | DIASTOLIC BLOOD PRESSURE: 70 MMHG | TEMPERATURE: 97 F | BODY MASS INDEX: 36.64 KG/M2 | SYSTOLIC BLOOD PRESSURE: 112 MMHG | HEIGHT: 64 IN | HEART RATE: 84 BPM

## 2024-06-27 DIAGNOSIS — K52.9 CHRONIC DIARRHEA: Primary | ICD-10-CM

## 2024-06-27 DIAGNOSIS — R10.12 LEFT UPPER QUADRANT ABDOMINAL PAIN: ICD-10-CM

## 2024-06-27 DIAGNOSIS — R73.01 ELEVATED FASTING GLUCOSE: ICD-10-CM

## 2024-06-27 DIAGNOSIS — K52.9 CHRONIC DIARRHEA: ICD-10-CM

## 2024-06-27 DIAGNOSIS — R30.0 DYSURIA: ICD-10-CM

## 2024-06-27 LAB
ALBUMIN SERPL-MCNC: 3.8 G/DL (ref 3.4–5)
ALBUMIN/GLOB SERPL: 1 {RATIO} (ref 1–2)
ALP LIVER SERPL-CCNC: 95 U/L
ALT SERPL-CCNC: 42 U/L
AMYLASE SERPL-CCNC: 35 U/L (ref 25–115)
ANION GAP SERPL CALC-SCNC: 4 MMOL/L (ref 0–18)
APPEARANCE: CLEAR
AST SERPL-CCNC: 17 U/L (ref 15–37)
BASOPHILS # BLD AUTO: 0.04 X10(3) UL (ref 0–0.2)
BASOPHILS NFR BLD AUTO: 0.6 %
BILIRUB SERPL-MCNC: 0.6 MG/DL (ref 0.1–2)
BILIRUBIN: NEGATIVE
BUN BLD-MCNC: 15 MG/DL (ref 9–23)
CALCIUM BLD-MCNC: 9.3 MG/DL (ref 8.5–10.1)
CHLORIDE SERPL-SCNC: 109 MMOL/L (ref 98–112)
CO2 SERPL-SCNC: 26 MMOL/L (ref 21–32)
CREAT BLD-MCNC: 0.88 MG/DL
EGFRCR SERPLBLD CKD-EPI 2021: 83 ML/MIN/1.73M2 (ref 60–?)
EOSINOPHIL # BLD AUTO: 0.2 X10(3) UL (ref 0–0.7)
EOSINOPHIL NFR BLD AUTO: 2.9 %
ERYTHROCYTE [DISTWIDTH] IN BLOOD BY AUTOMATED COUNT: 13.3 %
ERYTHROCYTE [SEDIMENTATION RATE] IN BLOOD: 16 MM/HR
EST. AVERAGE GLUCOSE BLD GHB EST-MCNC: 114 MG/DL (ref 68–126)
FASTING STATUS PATIENT QL REPORTED: YES
GLOBULIN PLAS-MCNC: 3.7 G/DL (ref 2.8–4.4)
GLUCOSE (URINE DIPSTICK): NEGATIVE MG/DL
GLUCOSE BLD-MCNC: 108 MG/DL (ref 70–99)
HBA1C MFR BLD: 5.6 % (ref ?–5.7)
HCT VFR BLD AUTO: 38.4 %
HGB BLD-MCNC: 13 G/DL
IMM GRANULOCYTES # BLD AUTO: 0.04 X10(3) UL (ref 0–1)
IMM GRANULOCYTES NFR BLD: 0.6 %
KETONES (URINE DIPSTICK): NEGATIVE MG/DL
LEUKOCYTES: NEGATIVE
LIPASE SERPL-CCNC: 31 U/L (ref ?–300)
LYMPHOCYTES # BLD AUTO: 1.57 X10(3) UL (ref 1–4)
LYMPHOCYTES NFR BLD AUTO: 22.7 %
MCH RBC QN AUTO: 30.5 PG (ref 26–34)
MCHC RBC AUTO-ENTMCNC: 33.9 G/DL (ref 31–37)
MCV RBC AUTO: 90.1 FL
MONOCYTES # BLD AUTO: 0.62 X10(3) UL (ref 0.1–1)
MONOCYTES NFR BLD AUTO: 8.9 %
MULTISTIX LOT#: NORMAL NUMERIC
NEUTROPHILS # BLD AUTO: 4.46 X10 (3) UL (ref 1.5–7.7)
NEUTROPHILS # BLD AUTO: 4.46 X10(3) UL (ref 1.5–7.7)
NEUTROPHILS NFR BLD AUTO: 64.3 %
NITRITE, URINE: NEGATIVE
OCCULT BLOOD: NEGATIVE
OSMOLALITY SERPL CALC.SUM OF ELEC: 289 MOSM/KG (ref 275–295)
PH, URINE: 5.5 (ref 4.5–8)
PLATELET # BLD AUTO: 295 10(3)UL (ref 150–450)
POTASSIUM SERPL-SCNC: 4.4 MMOL/L (ref 3.5–5.1)
PROT SERPL-MCNC: 7.5 G/DL (ref 6.4–8.2)
PROTEIN (URINE DIPSTICK): NEGATIVE MG/DL
RBC # BLD AUTO: 4.26 X10(6)UL
SODIUM SERPL-SCNC: 139 MMOL/L (ref 136–145)
SPECIFIC GRAVITY: 1.02 (ref 1–1.03)
T4 FREE SERPL-MCNC: 0.8 NG/DL (ref 0.8–1.7)
TSI SER-ACNC: 1.09 MIU/ML (ref 0.36–3.74)
URINE-COLOR: YELLOW
UROBILINOGEN,SEMI-QN: 0.2 MG/DL (ref 0–1.9)
WBC # BLD AUTO: 6.9 X10(3) UL (ref 4–11)

## 2024-06-27 PROCEDURE — 81003 URINALYSIS AUTO W/O SCOPE: CPT | Performed by: NURSE PRACTITIONER

## 2024-06-27 PROCEDURE — 83036 HEMOGLOBIN GLYCOSYLATED A1C: CPT

## 2024-06-27 PROCEDURE — 87086 URINE CULTURE/COLONY COUNT: CPT | Performed by: NURSE PRACTITIONER

## 2024-06-27 PROCEDURE — 82150 ASSAY OF AMYLASE: CPT

## 2024-06-27 PROCEDURE — 99214 OFFICE O/P EST MOD 30 MIN: CPT | Performed by: NURSE PRACTITIONER

## 2024-06-27 PROCEDURE — 84439 ASSAY OF FREE THYROXINE: CPT

## 2024-06-27 PROCEDURE — 80053 COMPREHEN METABOLIC PANEL: CPT

## 2024-06-27 PROCEDURE — 84443 ASSAY THYROID STIM HORMONE: CPT

## 2024-06-27 PROCEDURE — 85652 RBC SED RATE AUTOMATED: CPT

## 2024-06-27 PROCEDURE — 85025 COMPLETE CBC W/AUTO DIFF WBC: CPT

## 2024-06-27 PROCEDURE — 83690 ASSAY OF LIPASE: CPT

## 2024-06-27 NOTE — TELEPHONE ENCOUNTER
We will need pre-op paperwork and we will then be able to schedule patient for a pre-op H+P. Today's visit was for an acute concern/follow up--would not suffice for pre-op.

## 2024-06-27 NOTE — TELEPHONE ENCOUNTER
Nicky Larson requests I call pt to obtain more info on her appt she self scheduled this am for:     \"GI issues and abdominal pain\"    S/w pt on this. Reports she saw GI as recommended (see CT results last month), Dr Aguilera at [facility]. Had endo/colonoscopy last Thursday    Reports pathology was OK    Since the procedure has still been having chronic diarrhea all day every day, everything she eats upsets her stomach    No vomiting  When asking if fever she said she feels she has hard time controlling temperature. Sts yesterday she felt sweaty but no temp.    She advised GI of her continuing sx's and they said it is \"nothing GI related\" and advised her to see go back to her pcp.    Pt wonders if something with her thyroid or IBS?    Had stool testing workup 6/7 (see lab results)-- neg    I advised pt I would update you and to keep appt as scheduled.

## 2024-06-27 NOTE — PROGRESS NOTES
Alma Delia Padgett is a 43 year old female.  HPI:   Patient reports 4-5 bowel movements/day for the last 1 week since having EGD/colonoscopy --yesterday a few BM's had slight form, but now has returned to liquid stool- no form and \"specks\". Stool is yellow-light brown-red stool. Notes she has been eating a lot of red jello. Notes bowel movement 15-20 minutes after every time she eats. Occasional nausea. No vomiting. No fever. Feels sweaty at times. No chest pain.   Reports continued left upper abdominal pain- no change in this. No worsening symptoms. Rates pain as a \"8-9/10\" at worst with bowel movement, \"2-3/10\" at rest. Prior to EGD/Colonoscopy was having 2-3 BMs day- more formed.  Notes some dysuria. No urgency or frequency.  No recent travel.  No one else at home with similar symptoms.    Patient saw Dr. Aguilera at [facility] and had endoscopy/colonoscopy 06/20/2024. Patient has brought pathology report with her today. Negative H.Pylori biopsy.  Had stool culture, calprotectin, c-diff completed earlier this month- negative. C-reactive protein- normal.  In 2022 patient had inflammatory bowel disease panel and celiac panel with [facility] and normal.     Wt Readings from Last 6 Encounters:   06/27/24 214 lb 9.6 oz (97.3 kg)   05/21/24 221 lb (100.2 kg)   11/16/23 220 lb (99.8 kg)   11/10/23 220 lb (99.8 kg)   11/07/23 226 lb (102.5 kg)   07/20/23 221 lb (100.2 kg)     Current Outpatient Medications   Medication Sig Dispense Refill    sertraline 50 MG Oral Tab       mupirocin 2 % External Ointment Apply intranasally twice a day for 10 days then stop. Use along with OTC bacitracin ointment.      Budesonide-Formoterol Fumarate 160-4.5 MCG/ACT Inhalation Aerosol Inhale 2 puffs into the lungs 2 (two) times daily. Pt taking 2 puffs once daily      loratadine-pseudoephedrine ER  MG Oral Tablet 24 Hr Take 1 tablet by mouth at bedtime. 30 tablet 1    pantoprazole 40 MG Oral Tab EC Take 1 tablet  (40 mg total) by mouth 2 (two) times daily before meals. (Patient taking differently: Take 1 tablet (40 mg total) by mouth 2 (two) times daily before meals. Patient taking 40mg daily) 90 tablet 3    loperamide (IMODIUM A-D) 2 MG Oral Cap Take 1 capsule (2 mg total) by mouth 4 (four) times daily as needed for Diarrhea. 60 capsule 3    fluticasone propionate 50 MCG/ACT Nasal Suspension 2 sprays by Nasal route daily. 16 g 0    rosuvastatin 5 MG Oral Tab Take 1 tablet (5 mg total) by mouth nightly. 90 tablet 3    albuterol (PROAIR HFA) 108 (90 Base) MCG/ACT Inhalation Aero Soln Inhale into the lungs every 6 (six) hours as needed for Wheezing.      Multiple Vitamin (ONE-DAILY MULTI VITAMINS) Oral Tab Take 1 tablet by mouth daily.      cholecalciferol (VITAMIN D3) 125 MCG (5000 UT) Oral Cap Take 1 capsule (5,000 Units total) by mouth daily.      Ascorbic Acid (VITAMIN C) 100 MG Oral Tab Take 1 tablet (100 mg total) by mouth daily.      amphetamine-dextroamphetamine 20 MG Oral Tab Take by mouth daily.      triamcinolone 0.1 % External Ointment Apply 1 Application topically 2 (two) times daily. 15 g 0      Past Medical History:    Anxiety state    Asthma (HCC)    DEPRESSION    Depression    Extrinsic asthma, unspecified    GI DISORDER    reflux    Herpes simplex    History of COVID-19    1 month loss of taste and smell, cough    Migraines    Visual impairment    contacts      Social History:  Social History     Socioeconomic History    Marital status:    Occupational History    Occupation: [employer]     Employer: [employer]   Tobacco Use    Smoking status: Never    Smokeless tobacco: Never   Vaping Use    Vaping status: Never Used   Substance and Sexual Activity    Alcohol use: Not Currently     Comment: rarely    Drug use: No    Sexual activity: Not Currently     Partners: Male     Birth control/protection: Mirena, I.U.D.   Other Topics Concern    Caffeine Concern Yes     Comment: 3-5 per day     Stress Concern  Yes    Special Diet Yes     Comment: gluten free, diary free    Exercise No    Seat Belt Yes     Social Determinants of Health     Physical Activity: High Risk (3/11/2022)    Received from [facility]    Physical Activity     How often do you engage in moderate physical activity for 30 minutes or more?: 1 to 3 days a week     How often do you engage in vigorous physical activity for 20 minutes or more?: 1 to 3 days a week     How many hours per day do you spend sitting?: More than 8 hours    Received from [facility], [facility]    Stress    Received from [facility]        REVIEW OF SYSTEMS:   GENERAL HEALTH: feels well otherwise  RESPIRATORY: denies shortness of breath with exertion  CARDIOVASCULAR: denies chest pain on exertion  GI: SEE HPI  Musculoskeletal: No motor deficits  EXAM:   /70   Pulse 84   Temp 97 °F (36.1 °C) (Temporal)   Resp 16   Ht 5' 4\" (1.626 m)   Wt 214 lb 9.6 oz (97.3 kg)   BMI 36.84 kg/m²   GENERAL: well developed, well nourished,in no apparent distress  EYES: sclera clear without injection  NECK: supple,no adenopathy  LUNGS: clear to auscultation no rales, rhonchi or wheezes  CARDIO: RRR without murmur  GI: Normal bowel sounds ×4 quadrant, no hepatosplenomegaly or masses, and + RUQ tenderness with palpation.  RECTAL: normal rectal sphincter and tone. Hemoocult negative. Cindy L MA present during exam.   EXTREMITIES: no cyanosis, clubbing or edema  Musculoskeletal: No gross deficit No CVA tenderness with murphys punch.  Neurological: nerves II through XII grossly intact no sensorimotor deficit  Psychological: Mood and affect are normal. Good communication skills.  ASSESSMENT AND PLAN:     Encounter Diagnoses   Name Primary?    Chronic diarrhea Yes    Left upper quadrant abdominal pain     Dysuria        Orders Placed This Encounter   Procedures    CBC W Differential W Platelet [E]    Comp Metabolic Panel (14) [E]    TSH and Free T4 [E]    Occult Blood,  Fecal, Immunoassay (Blue cards) [E]    Lipase [E]    Amylase    Sed Rate, Westergren (Automated) [E]    URINALYSIS, AUTO, W/O SCOPE    Stool Culture w/Shigatoxin [E]    OVA AND PARASITES [681]    C. diff toxigenic PCR (OPT) [E]    Urine Culture, Routine [E]       Meds & Refills for this Visit:  Requested Prescriptions      No prescriptions requested or ordered in this encounter       Imaging & Consults:  None    Follow Up with:  No follow-up provider specified.    1. Chronic diarrhea  - CBC W Differential W Platelet [E]; Future  - Comp Metabolic Panel (14) [E]; Future  - TSH and Free T4 [E]; Future  - Stool Culture w/Shigatoxin [E]; Future  - Occult Blood, Fecal, Immunoassay (Blue cards) [E]; Future  - OVA AND PARASITES [681]; Future  - C. diff toxigenic PCR (OPT) [E]; Future  - Sed Rate, Westergren (Automated) [E]; Future    2. Left upper quadrant abdominal pain  - CBC W Differential W Platelet [E]; Future  - Comp Metabolic Panel (14) [E]; Future  - Lipase [E]; Future  - Amylase; Future    3. Dysuria  - URINALYSIS, AUTO, W/O SCOPE  - Urine Culture, Routine [E]; Future  - Urine Culture, Routine [E]      UA: neg nitrates, neg leuks, neg blood  Urine culture sent.  Labs today as ordered.  Stool studies as ordered.   Reviewed 05/2024 CT abd/pelvis- unremarkable. No change in abdominal pain- just persists.  Changes in bowel movement--more frequent, now liquid- no form.   BRAT diet. Push fluids, stay hydrated.  If new/worsening symptoms to ER- pt voiced understanding.    The patient indicates understanding of these issues and agrees to the plan.

## 2024-06-28 ENCOUNTER — LAB ENCOUNTER (OUTPATIENT)
Dept: LAB | Age: 44
End: 2024-06-28
Attending: NURSE PRACTITIONER
Payer: COMMERCIAL

## 2024-06-28 DIAGNOSIS — K52.9 CHRONIC DIARRHEA: ICD-10-CM

## 2024-06-28 LAB — HEMOCCULT STL QL: NEGATIVE

## 2024-06-28 PROCEDURE — 87493 C DIFF AMPLIFIED PROBE: CPT

## 2024-06-28 PROCEDURE — 87177 OVA AND PARASITES SMEARS: CPT

## 2024-06-28 PROCEDURE — 87046 STOOL CULTR AEROBIC BACT EA: CPT

## 2024-06-28 PROCEDURE — 87015 SPECIMEN INFECT AGNT CONCNTJ: CPT

## 2024-06-28 PROCEDURE — 87045 FECES CULTURE AEROBIC BACT: CPT

## 2024-06-28 PROCEDURE — 87209 SMEAR COMPLEX STAIN: CPT

## 2024-06-28 PROCEDURE — 87427 SHIGA-LIKE TOXIN AG IA: CPT

## 2024-06-28 PROCEDURE — 82274 ASSAY TEST FOR BLOOD FECAL: CPT

## 2024-06-29 LAB — C DIFF TOX B STL QL: NEGATIVE

## 2024-07-03 ENCOUNTER — MED REC SCAN ONLY (OUTPATIENT)
Dept: FAMILY MEDICINE CLINIC | Facility: CLINIC | Age: 44
End: 2024-07-03

## 2024-07-09 ENCOUNTER — MED REC SCAN ONLY (OUTPATIENT)
Dept: FAMILY MEDICINE CLINIC | Facility: CLINIC | Age: 44
End: 2024-07-09

## 2024-07-15 ENCOUNTER — OFFICE VISIT (OUTPATIENT)
Dept: FAMILY MEDICINE CLINIC | Facility: CLINIC | Age: 44
End: 2024-07-15
Payer: COMMERCIAL

## 2024-07-15 VITALS
HEART RATE: 88 BPM | BODY MASS INDEX: 36.09 KG/M2 | RESPIRATION RATE: 16 BRPM | TEMPERATURE: 97 F | DIASTOLIC BLOOD PRESSURE: 74 MMHG | SYSTOLIC BLOOD PRESSURE: 108 MMHG | HEIGHT: 64 IN | WEIGHT: 211.38 LBS

## 2024-07-15 DIAGNOSIS — J34.89 NASAL OBSTRUCTION: ICD-10-CM

## 2024-07-15 DIAGNOSIS — J45.20 MILD INTERMITTENT REACTIVE AIRWAY DISEASE WITHOUT COMPLICATION (HCC): ICD-10-CM

## 2024-07-15 DIAGNOSIS — K21.9 GASTROESOPHAGEAL REFLUX DISEASE, UNSPECIFIED WHETHER ESOPHAGITIS PRESENT: ICD-10-CM

## 2024-07-15 DIAGNOSIS — Z01.818 PRE-OPERATIVE CLEARANCE: Primary | ICD-10-CM

## 2024-07-15 DIAGNOSIS — E78.00 PURE HYPERCHOLESTEROLEMIA: ICD-10-CM

## 2024-07-15 DIAGNOSIS — J34.89 CONCHA BULLOSA: ICD-10-CM

## 2024-07-15 DIAGNOSIS — J32.9 CHRONIC SINUSITIS, UNSPECIFIED LOCATION: ICD-10-CM

## 2024-07-15 DIAGNOSIS — G47.33 OSA (OBSTRUCTIVE SLEEP APNEA): ICD-10-CM

## 2024-07-15 DIAGNOSIS — J34.2 DEVIATED SEPTUM: ICD-10-CM

## 2024-07-15 DIAGNOSIS — J34.3 NASAL TURBINATE HYPERTROPHY: ICD-10-CM

## 2024-07-15 DIAGNOSIS — J30.9 ALLERGIC RHINITIS, UNSPECIFIED SEASONALITY, UNSPECIFIED TRIGGER: ICD-10-CM

## 2024-07-15 DIAGNOSIS — E66.9 OBESITY (BMI 30-39.9): ICD-10-CM

## 2024-07-15 RX ORDER — ACETAMINOPHEN AND CODEINE PHOSPHATE 300; 30 MG/1; MG/1
TABLET ORAL
COMMUNITY
Start: 2024-07-03

## 2024-07-15 RX ORDER — DIAZEPAM 5 MG/1
TABLET ORAL
COMMUNITY
Start: 2024-07-01

## 2024-07-15 RX ORDER — METHYLPREDNISOLONE 4 MG/1
TABLET ORAL
COMMUNITY
Start: 2024-07-01

## 2024-07-15 RX ORDER — CLINDAMYCIN HYDROCHLORIDE 150 MG/1
CAPSULE ORAL
COMMUNITY
Start: 2024-07-01

## 2024-07-15 NOTE — H&P
Alma Delia Padgett is a 43 year old female who presents for a pre-operative physical exam. Patient is to have balloon sinuplasty, endoscopic sinus surgery, septoplasty, reduction turbinates  and mercedes, to be done by Dr. Tico Garnica at [facility] in-office on 07/18/2024.    Prior anesthesia- yes, no problems with anesthesia previously  PMH negative for arrhythmia, CAD, CHF, liver disease, kidney disease, no coagulation delay, no PE or DVT history, no seizure disorder. Hx of TMJ  Smoking history- denies  Alcohol use- social alcohol use, last drink 2 months ago  THERON risk- yes, recent sleep study showing sleep apnea- needs follow up with provider.  No family history of adverse reaction to anesthesia, no aneurysm, no bleeding problems, no clotting disorder, no sudden cardiac death. Paternal Grandmother- hx of stroke  Medications reviewed.     HPI:   Pt denies any complaints.    Current Outpatient Medications   Medication Sig Dispense Refill    acetaminophen-codeine 300-30 MG Oral Tab Take 1-2 tablets by mouth as needed every 4-6 hours for post operative pain      diazePAM 5 MG Oral Tab Take 1 tablet by mouth 45 mins prior to arrival to procedure. Take one tablet upon arrival to procedure. Keep remaining tablet for use as directed by MD      clindamycin 150 MG Oral Cap Take 1 capsule three times daily for 10 days post op.      methylPREDNISolone 4 MG Oral Tablet Therapy Pack Take as directed. Start 6 days prior to procedure.      sertraline 50 MG Oral Tab       Budesonide-Formoterol Fumarate 160-4.5 MCG/ACT Inhalation Aerosol Inhale 2 puffs into the lungs 2 (two) times daily. Pt taking 2 puffs once daily      loratadine-pseudoephedrine ER  MG Oral Tablet 24 Hr Take 1 tablet by mouth at bedtime. 30 tablet 1    pantoprazole 40 MG Oral Tab EC Take 1 tablet (40 mg total) by mouth 2 (two) times daily before meals. (Patient taking differently: Take 1 tablet (40 mg total) by mouth 2 (two) times daily before  meals. Patient taking 40mg daily) 90 tablet 3    fluticasone propionate 50 MCG/ACT Nasal Suspension 2 sprays by Nasal route daily. 16 g 0    rosuvastatin 5 MG Oral Tab Take 1 tablet (5 mg total) by mouth nightly. 90 tablet 3    albuterol (PROAIR HFA) 108 (90 Base) MCG/ACT Inhalation Aero Soln Inhale into the lungs every 6 (six) hours as needed for Wheezing.      Multiple Vitamin (ONE-DAILY MULTI VITAMINS) Oral Tab Take 1 tablet by mouth daily.      cholecalciferol (VITAMIN D3) 125 MCG (5000 UT) Oral Cap Take 1 capsule (5,000 Units total) by mouth daily.      Ascorbic Acid (VITAMIN C) 100 MG Oral Tab Take 1 tablet (100 mg total) by mouth daily.      amphetamine-dextroamphetamine 20 MG Oral Tab Take by mouth daily.      triamcinolone 0.1 % External Ointment Apply 1 Application topically 2 (two) times daily. 15 g 0      Allergies:   Allergies   Allergen Reactions    Duricef [Cefadroxil Monohydrate] HIVES    Oat OTHER (SEE COMMENTS)     digestive    Soybean Oil DIARRHEA, HIVES and NAUSEA AND VOMITING    Soybeans DIARRHEA, HIVES and NAUSEA AND VOMITING    Sulfa Antibiotics HIVES    Wellbutrin Xl [Budeprion Xl] HIVES    Zithromax HIVES    Amoxicillin NAUSEA AND VOMITING    Clavulanic Acid NAUSEA AND VOMITING    E-Egg White-Soybean Lecithin DIARRHEA and NAUSEA AND VOMITING    Milk DIARRHEA, FATIGUE and NAUSEA AND VOMITING    Other DIARRHEA and NAUSEA AND VOMITING    Egg Phosphatides-Glycerin-Soybean Oil [Intralipid]     Gluten Flour     Milk Protein-Mometasone [Asmanex]     Mometasone Furoate OTHER (SEE COMMENTS)      Past Medical History:    Anxiety state    Asthma (HCC)    DEPRESSION    Depression    Extrinsic asthma, unspecified    GI DISORDER    reflux    Herpes simplex    History of COVID-19    1 month loss of taste and smell, cough    Migraines    Visual impairment    contacts      Past Surgical History:   Procedure Laterality Date    Other surgical history  1997    wisdom teeth       Family History   Problem  Relation Age of Onset    Diabetes Mother     Dementia Mother     Glaucoma Father     Other (Other) Other         aunt with hypothyroidism    Breast Cancer Other         mid 40s    Cancer Other         breast    Stroke Paternal Grandmother     Glaucoma Paternal Grandmother     Breast Cancer Maternal Aunt         50's    Breast Cancer Maternal Cousin Female 38    Pancreatic Cancer Paternal Grandfather         late 70s    Heart Attack Neg     High Blood Pressure Neg     High Cholesterol Neg       Social History:   Social History     Socioeconomic History    Marital status:    Occupational History    Occupation: [employer]     Employer: [employer]   Tobacco Use    Smoking status: Never    Smokeless tobacco: Never   Vaping Use    Vaping status: Never Used   Substance and Sexual Activity    Alcohol use: Not Currently     Comment: rarely    Drug use: No    Sexual activity: Not Currently     Partners: Male     Birth control/protection: Mirena, I.U.D.   Other Topics Concern    Caffeine Concern Yes     Comment: 3-5 per day     Stress Concern Yes    Special Diet Yes     Comment: gluten free, diary free    Exercise No    Seat Belt Yes     Social Determinants of Health     Physical Activity: High Risk (3/11/2022)    Received from [facility]    Physical Activity     How often do you engage in moderate physical activity for 30 minutes or more?: 1 to 3 days a week     How often do you engage in vigorous physical activity for 20 minutes or more?: 1 to 3 days a week     How many hours per day do you spend sitting?: More than 8 hours    [facility] from [facility]    Stress    Received from [facility]    [facility]      Occ: . : N. Children: Y.   Exercise: minimal.  Diet:  MONITORS diet     REVIEW OF SYSTEMS:   GENERAL: feels well otherwise  EYES:denies blurred vision or double vision  HEENT: denies nasal congestion, sinus pain or ST  LUNGS: denies shortness of breath with  exertion  CARDIOVASCULAR: denies chest pain on exertion  GI: denies abdominal pain,denies heartburn  : denies dysuria, vaginal discharge or itching, denies nocturia  MUSCULOSKELETAL: denies back pain  NEURO: denies headaches  PSYCHE: denies depression or anxiety  HEMATOLOGIC: denies hx of anemia  ENDOCRINE: denies thyroid history  ALL/ASTHMA: + allergies- see allergy list  EXAM:   /74   Pulse 88   Temp 97 °F (36.1 °C) (Temporal)   Resp 16   Ht 5' 4\" (1.626 m)   Wt 211 lb 6.4 oz (95.9 kg)   BMI 36.29 kg/m²   GENERAL: well developed, well nourished,in no apparent distress  HEENT: atraumatic, normocephalic,ears and throat are clear  EYES:PERRLA, EOMI, normal optic disk,conjunctiva are clear  NECK: supple,no adenopathy  LUNGS: clear to auscultation  CARDIO: RRR without murmur  GI: good BS's,no masses, HSM or tenderness  MUSCULOSKELETAL: back is not tender,FROM of the back  EXTREMITIES: no cyanosis, clubbing or edema  NEURO: Oriented times three,cranial nerves are intact,motor and sensory are grossly intact  ASSESSMENT AND PLAN:   Alma Delia Padgett is a 43 year old female who presents for a pre-operative physical exam.   Patient is to have balloon sinuplasty, endoscopic sinus surgery, septoplasty, reduction turbinates  and mercedes, to be done by Dr. Tico Garnica at [facility] in-office on 07/18/2024.  Pt is a good surgical candidate. This consult was sent back the referring physician, Dr. Garnica.     1. Pre-operative clearance    2. Nasal obstruction    3. Nasal turbinate hypertrophy    4. Deviated septum    5. Chronic sinusitis, unspecified location    6. Mercedes bullosa    7. Allergic rhinitis, unspecified seasonality, unspecified trigger    8. Gastroesophageal reflux disease, unspecified whether esophagitis present    9. Mild intermittent reactive airway disease without complication (HCC)    10. Pure hypercholesterolemia    11. THERON (obstructive sleep apnea)    12. Obesity (BMI 30-39.9)

## 2024-08-13 ENCOUNTER — MED REC SCAN ONLY (OUTPATIENT)
Dept: FAMILY MEDICINE CLINIC | Facility: CLINIC | Age: 44
End: 2024-08-13

## 2024-09-19 ENCOUNTER — TELEPHONE (OUTPATIENT)
Dept: FAMILY MEDICINE CLINIC | Facility: CLINIC | Age: 44
End: 2024-09-19

## 2024-09-19 DIAGNOSIS — E78.00 PURE HYPERCHOLESTEROLEMIA: ICD-10-CM

## 2024-09-21 ENCOUNTER — TELEPHONE (OUTPATIENT)
Dept: FAMILY MEDICINE CLINIC | Facility: CLINIC | Age: 44
End: 2024-09-21

## 2024-09-21 NOTE — TELEPHONE ENCOUNTER
Patient due for mammogram. Ordered recently in May by Dr. Fede Jacobo. [employer] message sent as reminder.

## 2024-09-26 NOTE — TELEPHONE ENCOUNTER
Dev to patient if she had changed pcp doctor that is now taking over her medication care and if not that she is due for an appointment with Dr. Lazo since last time we saw her was 2023

## 2024-10-05 RX ORDER — ROSUVASTATIN CALCIUM 5 MG/1
5 TABLET, COATED ORAL NIGHTLY
Qty: 90 TABLET | Refills: 3 | OUTPATIENT
Start: 2024-10-05

## 2024-12-05 ENCOUNTER — PATIENT MESSAGE (OUTPATIENT)
Dept: FAMILY MEDICINE CLINIC | Facility: CLINIC | Age: 44
End: 2024-12-05

## (undated) DEVICE — TROCAR: Brand: KII® SLEEVE

## (undated) DEVICE — CLOSURE EXOFIN 1.0ML

## (undated) DEVICE — TISSUE RETRIEVAL SYSTEM: Brand: INZII RETRIEVAL SYSTEM

## (undated) DEVICE — LAP CHOLE/APPY CDS-LF: Brand: MEDLINE INDUSTRIES, INC.

## (undated) DEVICE — LIGHT HANDLE

## (undated) DEVICE — ZIPWIRE GUIDEWIRE .035X150 STR

## (undated) DEVICE — ENDOPATH 5MM CURVED SCISSORS WITH MONOPOLAR CAUTERY: Brand: ENDOPATH

## (undated) DEVICE — TIGERTAIL 5F FLXTIP 70CM

## (undated) DEVICE — Device

## (undated) DEVICE — TROCAR: Brand: KII FIOS FIRST ENTRY

## (undated) DEVICE — STERILE POLYISOPRENE POWDER-FREE SURGICAL GLOVES: Brand: PROTEXIS

## (undated) DEVICE — SLEEVE KENDALL SCD EXPRESS MED

## (undated) DEVICE — C-ARM: Brand: UNBRANDED

## (undated) DEVICE — SUT MONOCRYL 4-0 PS-2 Y496G

## (undated) DEVICE — TROCAR: Brand: KII SHIELDED BLADED ACCESS SYSTEM

## (undated) DEVICE — SHEET,DRAPE,40X58,STERILE: Brand: MEDLINE

## (undated) DEVICE — 40580 - THE PINK PAD - ADVANCED TRENDELENBURG POSITIONING KIT: Brand: 40580 - THE PINK PAD - ADVANCED TRENDELENBURG POSITIONING KIT

## (undated) DEVICE — #11 STERILE BLADE: Brand: POLYMER COATED BLADES

## (undated) DEVICE — PENCIL TELESCOPE MEGADYNE SE

## (undated) DEVICE — VISUALIZATION SYSTEM: Brand: CLEARIFY

## (undated) DEVICE — SUT PDS II 0 CT-2 Z334H

## (undated) DEVICE — TRAY SURESTEP 16 BARDEX UMETR

## (undated) DEVICE — SOLUTION  .9 1000ML BTL

## (undated) DEVICE — ENDOPATH ULTRA VERESS INSUFFLATION NEEDLES WITH LUER LOCK CONNECTORS: Brand: ENDOPATH

## (undated) NOTE — MR AVS SNAPSHOT
After Visit Summary   10/21/2019    Caity Mendieta    MRN: [id number]           Visit Information     Date & Time  10/21/2019 11:20 AM Provider  Vimal Rushing MD [address] [place] [address], [address] [place] [address],[address], [facility]  P omeprazole 20 MG Oral Capsule Delayed Release Take 20 mg by mouth every morning before breakfast.      Diagnoses for This Visit    Encounter for gynecological examination without abnormal finding   [293597]  -  Primary  Cervical cancer screening   [[id number] Because of the highly sensitive equipment and privacy of all our patients, children will not be permitted in the exam rooms, unless otherwise noted and in   accordance with departmental policy.                  Educational Information    Healthy Diet and Re If you receive a survey from [employer], please take a few minutes to complete it and provide feedback. We strive to deliver the best patient experience and are looking for ways to make improvements. Your feedback will help us do so.  For more infor

## (undated) NOTE — LETTER
ASTHMA ACTION PLAN for Esther Meigs     : 1980     Date: 10/30/2019  Provider:  ALFREDO Piedra  Phone for doctor or clinic: [facility], [address], [address] [facility] [address], [address]  [phone]

## (undated) NOTE — LETTER
AUTHORIZATION FOR SURGICAL OPERATION OR OTHER PROCEDURE    1.  I hereby authorize Dr. Paz Ahumada, and [facility] staff assigned to my case to perform the following operation and/or procedure at the [facility]:    _________________________________ Time:  ________ A. M.  P.M.        Patient Name:  ______________________________________________________  (please print)      Patient signature:  ___________________________________________________             Relationship to Patient:

## (undated) NOTE — MR AVS SNAPSHOT
[facility], [address] [address] [phone]               Thank you for choosing us for your health care visit with Renea Joseph PA-C.   We are glad to serve you and happy to provide you with this physician's office. At that time, you will be provided with any authorization numbers or be assured that none are required. You can then schedule your appointment.  Failure to obtain required authorization numbers can create reimbursement difficulties for y Hydrocortisone Valerate 0.2 % Oint   Apply BID prn. Commonly known as:  WESTCORT           Levonorgestrel-Ethinyl Estrad 0.1-20 MG-MCG Tabs   Take 1 tablet by mouth daily.    Commonly known as:  SUE DUBOIS LESSINA           Montelukast Sodium 10 MG Ta

## (undated) NOTE — LETTER
ASTHMA ACTION PLAN for Vanita Ruiz     : 1980     Date: 3/26/2019  Provider:  ALFREDO Mcleod  Phone for doctor or clinic: [facility], [facility], [address] [address] [phone]

## (undated) NOTE — Clinical Note
ASTHMA ACTION PLAN for Jessi White     : 1980     Date: 2017  Provider:  Qasim Young PA-C  Phone for doctor or clinic: [address] [facility], [address] [phone]

## (undated) NOTE — ED AVS SNAPSHOT
Dayton Mask   MRN: [id number]    Department:  [facility] Emergency Department   Date of Visit:  6/4/2019           Disclosure     Insurance plans vary and the physician(s) referred by the ER may not be covered by your plan.  Please contact tell this physician (or your personal doctor if your instructions are to return to your personal doctor) about any new or lasting problems. The primary care or specialist physician will see patients referred from the [facility] Emergency Department.  Justino Sargent

## (undated) NOTE — LETTER
11/22/2023              Alma Delia Whittington [place]         Dear Michael Malloy,    Our records indicate that the tests ordered for you by Janita Dakin, MD  have not been done. If you have, in fact, already completed the tests or you do not wish to have the tests done, please contact our office at [address] [place] [address]. Otherwise, please proceed with the testing. To schedule a test at any [facility], call [facility] at [phone], Monday through Friday between 7:30am to 6pm and on Saturday between 8am and 1pm.   Evening and weekend appointments for your exam are available.    Labs and Imaging order   Date order:09/18/23    Calprotectin, Fecal        Sincerely,    Janita Dakin, MD  [facility], [address]  [place] [address] [phone]

## (undated) NOTE — MR AVS SNAPSHOT
[facility], [address] [address] [phone]               Thank you for choosing us for your health care visit with Jessica Kamara PA-C.   We are glad to serve you and happy to provide you with this med list.                Albuterol Sulfate  (90 Base) MCG/ACT Aers   Inhale 2 puffs into the lungs every 4 (four) hours as needed for Wheezing.    Commonly known as:  PROAIR HFA           ALPRAZolam 0.25 MG Tabs   1 po once daily prn   Commonly known If you've recently had a stay at the Hospital you can access your discharge instructions in PHRQL by going to Visits < Admission Summaries.  If you've been to the Emergency Department or your doctor's office, you can view your past visit information in My

## (undated) NOTE — MR AVS SNAPSHOT
[facility], [address] [address] [phone]               Thank you for choosing us for your health care visit with Ronn Boland PA-C.   We are glad to serve you and happy to provide you with this ergocalciferol [address] units Caps   TAKE 1 CAPSULE BY MOUTH 1 TIME A WEEK   Commonly known as:  DRISDOL/VITAMIN D2           FLUoxetine HCl 20 MG Caps   3 po daily   What changed:    - medication strength  - how much to take  - how to take this  - when to t office, you can view your past visit information in [facility] by going to Visits < Visit Summaries. [employer] questions? Call [phone] for help. [employer] is NOT to be used for urgent needs. For medical emergencies, dial 911.         Educational Inform

## (undated) NOTE — MR AVS SNAPSHOT
[facility], [address] [address] [phone]               Thank you for choosing us for your health care visit with Navin Logan PA-C.   We are glad to serve you and happy to provide you with this Commonly known as:  PROAIR HFA           * Albuterol Sulfate  (90 BASE) MCG/ACT Aers   Inhale 2 puffs into the lungs every 4 (four) hours as needed for Wheezing (With spacer).            alprazolam 0.25 MG Tabs   1 po once daily prn   Commonly known - benzonatate 100 MG Caps  - FLUoxetine HCl 20 MG Caps  - Hydrocortisone Valerate 0.2 % Oint  - levofloxacin 500 MG Tabs  - Montelukast Sodium 10 MG Tabs            Referral Information     Referral Order Referred to Dalia Encarnacion Phone Visits Status Diagn